# Patient Record
Sex: MALE | Race: WHITE | NOT HISPANIC OR LATINO | Employment: FULL TIME | URBAN - METROPOLITAN AREA
[De-identification: names, ages, dates, MRNs, and addresses within clinical notes are randomized per-mention and may not be internally consistent; named-entity substitution may affect disease eponyms.]

---

## 2020-12-15 ENCOUNTER — HISTORICAL (OUTPATIENT)
Dept: ADMINISTRATIVE | Facility: HOSPITAL | Age: 30
End: 2020-12-15

## 2021-06-22 ENCOUNTER — HISTORICAL (OUTPATIENT)
Dept: ADMINISTRATIVE | Facility: HOSPITAL | Age: 31
End: 2021-06-22

## 2021-06-22 LAB
ALBUMIN SERPL-MCNC: 5.2 G/DL (ref 4.1–5.2)
ALBUMIN/GLOB SERPL: 2.4 {RATIO} (ref 1.2–2.2)
ALP SERPL-CCNC: 62 IU/L (ref 48–121)
ALT SERPL-CCNC: 13 IU/L (ref 0–44)
AST SERPL-CCNC: 15 IU/L (ref 0–40)
BASOPHILS # BLD AUTO: 0 X10E3/UL (ref 0–0.2)
BASOPHILS NFR BLD AUTO: 1 %
BILIRUB SERPL-MCNC: 0.5 MG/DL (ref 0–1.2)
BUN SERPL-MCNC: 12 MG/DL (ref 6–20)
CALCIUM SERPL-MCNC: 10.4 MG/DL (ref 8.7–10.2)
CHLORIDE SERPL-SCNC: 95 MMOL/L (ref 96–106)
CHOLEST SERPL-MCNC: 225 MG/DL (ref 100–199)
CHOLEST/HDLC SERPL: 4 RATIO (ref 0–5)
CO2 SERPL-SCNC: 27 MMOL/L (ref 20–29)
CREAT SERPL-MCNC: 1 MG/DL (ref 0.76–1.27)
CREAT/UREA NIT SERPL: 12 (ref 9–20)
EOSINOPHIL # BLD AUTO: 0.1 X10E3/UL (ref 0–0.4)
EOSINOPHIL NFR BLD AUTO: 1 %
ERYTHROCYTE [DISTWIDTH] IN BLOOD BY AUTOMATED COUNT: 12.6 % (ref 11.6–15.4)
GLOBULIN SER-MCNC: 2.2 G/DL (ref 1.5–4.5)
GLUCOSE SERPL-MCNC: 99 MG/DL (ref 65–99)
HBA1C MFR BLD: 5.6 % (ref 4.8–5.6)
HCT VFR BLD AUTO: 46.5 % (ref 37.5–51)
HDLC SERPL-MCNC: 56 MG/DL
HGB BLD-MCNC: 15.3 G/DL (ref 13–17.7)
LDLC SERPL CALC-MCNC: 136 MG/DL (ref 0–99)
LYMPHOCYTES # BLD AUTO: 2.4 X10E3/UL (ref 0.7–3.1)
LYMPHOCYTES NFR BLD AUTO: 39 %
MCH RBC QN AUTO: 29.9 PG (ref 26.6–33)
MCHC RBC AUTO-ENTMCNC: 32.9 G/DL (ref 31.5–35.7)
MCV RBC AUTO: 91 FL (ref 79–97)
MONOCYTES # BLD AUTO: 0.4 X10E3/UL (ref 0.1–0.9)
MONOCYTES NFR BLD AUTO: 7 %
NEUTROPHILS # BLD AUTO: 3.3 X10E3/UL (ref 1.4–7)
NEUTROPHILS NFR BLD AUTO: 52 %
PLATELET # BLD AUTO: 223 X10E3/UL (ref 150–450)
POTASSIUM SERPL-SCNC: 5.1 MMOL/L (ref 3.5–5.2)
PROT SERPL-MCNC: 7.4 G/DL (ref 6–8.5)
RBC # BLD AUTO: 5.11 X10(6)/MCL (ref 4.14–5.8)
SODIUM SERPL-SCNC: 139 MMOL/L (ref 134–144)
TRIGL SERPL-MCNC: 186 MG/DL (ref 0–149)
VLDLC SERPL CALC-MCNC: 33 MG/DL (ref 5–40)
WBC # SPEC AUTO: 6.3 X10E3/UL (ref 3.4–10.8)

## 2022-04-11 ENCOUNTER — HISTORICAL (OUTPATIENT)
Dept: ADMINISTRATIVE | Facility: HOSPITAL | Age: 32
End: 2022-04-11

## 2022-04-29 VITALS
BODY MASS INDEX: 30.08 KG/M2 | SYSTOLIC BLOOD PRESSURE: 132 MMHG | WEIGHT: 210.13 LBS | DIASTOLIC BLOOD PRESSURE: 64 MMHG | HEIGHT: 70 IN | OXYGEN SATURATION: 97 %

## 2023-06-11 ENCOUNTER — HOSPITAL ENCOUNTER (EMERGENCY)
Facility: HOSPITAL | Age: 33
Discharge: HOME | End: 2023-06-11
Payer: COMMERCIAL

## 2023-06-11 VITALS
WEIGHT: 205 LBS | BODY MASS INDEX: 29.35 KG/M2 | OXYGEN SATURATION: 98 % | RESPIRATION RATE: 18 BRPM | HEIGHT: 70 IN | DIASTOLIC BLOOD PRESSURE: 84 MMHG | TEMPERATURE: 97 F | HEART RATE: 74 BPM | SYSTOLIC BLOOD PRESSURE: 146 MMHG

## 2023-06-11 DIAGNOSIS — T78.3XXA ANGIOEDEMA, INITIAL ENCOUNTER: Primary | ICD-10-CM

## 2023-06-11 PROCEDURE — 96372 THER/PROPH/DIAG INJ SC/IM: CPT | Performed by: NURSE PRACTITIONER

## 2023-06-11 PROCEDURE — 99284 EMERGENCY DEPT VISIT MOD MDM: CPT

## 2023-06-11 PROCEDURE — 25000003 PHARM REV CODE 250: Performed by: NURSE PRACTITIONER

## 2023-06-11 PROCEDURE — 63600175 PHARM REV CODE 636 W HCPCS: Performed by: NURSE PRACTITIONER

## 2023-06-11 RX ORDER — TIRZEPATIDE 7.5 MG/.5ML
7.5 INJECTION, SOLUTION SUBCUTANEOUS
COMMUNITY
End: 2023-08-17 | Stop reason: ALTCHOICE

## 2023-06-11 RX ORDER — LEVOCETIRIZINE DIHYDROCHLORIDE 5 MG/1
5 TABLET, FILM COATED ORAL NIGHTLY
Qty: 30 TABLET | Refills: 0 | Status: SHIPPED | OUTPATIENT
Start: 2023-06-11 | End: 2023-10-25

## 2023-06-11 RX ORDER — FAMOTIDINE 40 MG/1
40 TABLET, FILM COATED ORAL DAILY
Qty: 30 TABLET | Refills: 0 | Status: SHIPPED | OUTPATIENT
Start: 2023-06-11 | End: 2023-10-25

## 2023-06-11 RX ORDER — METHYLPREDNISOLONE SOD SUCC 125 MG
125 VIAL (EA) INJECTION
Status: COMPLETED | OUTPATIENT
Start: 2023-06-11 | End: 2023-06-11

## 2023-06-11 RX ORDER — DIPHENHYDRAMINE HYDROCHLORIDE 50 MG/ML
100 INJECTION INTRAMUSCULAR; INTRAVENOUS
Status: COMPLETED | OUTPATIENT
Start: 2023-06-11 | End: 2023-06-11

## 2023-06-11 RX ORDER — PREDNISONE 20 MG/1
20 TABLET ORAL DAILY
Qty: 5 TABLET | Refills: 0 | Status: SHIPPED | OUTPATIENT
Start: 2023-06-11 | End: 2023-07-06 | Stop reason: ALTCHOICE

## 2023-06-11 RX ORDER — FAMOTIDINE 20 MG/1
40 TABLET, FILM COATED ORAL
Status: COMPLETED | OUTPATIENT
Start: 2023-06-11 | End: 2023-06-11

## 2023-06-11 RX ADMIN — DIPHENHYDRAMINE HYDROCHLORIDE 100 MG: 50 INJECTION INTRAMUSCULAR; INTRAVENOUS at 06:06

## 2023-06-11 RX ADMIN — FAMOTIDINE 40 MG: 20 TABLET, FILM COATED ORAL at 06:06

## 2023-06-11 RX ADMIN — METHYLPREDNISOLONE SODIUM SUCCINATE 125 MG: 125 INJECTION, POWDER, FOR SOLUTION INTRAMUSCULAR; INTRAVENOUS at 07:06

## 2023-06-11 NOTE — ED PROVIDER NOTES
Encounter Date: 6/11/2023       History     Chief Complaint   Patient presents with    Oral Swelling     Pt reports lip swelling onset this am around 0630 this am, started to right corner of upper lip. Pt reports is taking lisinopril, called his PCP (Dr. Chou) and was told to take 50mg of Benadryl. Pt reports has been on Lisinopril for years. Also reports thought he got his deodorant on his upper lip so he put some on the left side and swelling started within 20 minutes.      Sharif presents with a swollen top lip that started this morning and progressed throughout the day.  He is on Lisinopril, but has been for quite some time.  He is also using a deodorant that he thinks may have caused the swelling.  He has no tongue swelling, no breathing issues, no swallowing issues.      The history is provided by the patient.   Review of patient's allergies indicates:   Allergen Reactions    Ace inhibitors Swelling     Past Medical History:   Diagnosis Date    Depression     Hypertension      No past surgical history on file.  No family history on file.     Review of Systems   Constitutional:  Negative for fatigue and fever.   HENT:  Negative for congestion and rhinorrhea.         Swelling of the upper lip     Eyes:  Negative for visual disturbance.   Respiratory:  Negative for cough, shortness of breath and wheezing.    Cardiovascular:  Negative for chest pain and palpitations.   Gastrointestinal:  Negative for abdominal distention, abdominal pain, diarrhea, nausea and vomiting.   Endocrine: Negative for cold intolerance and heat intolerance.   Genitourinary:  Negative for dysuria.   Musculoskeletal:  Negative for arthralgias and myalgias.   Skin:  Negative for rash.   Neurological:  Negative for dizziness, weakness, light-headedness, numbness and headaches.   Hematological:  Negative for adenopathy. Does not bruise/bleed easily.   Psychiatric/Behavioral:  Negative for dysphoric mood. The patient is not nervous/anxious.       Physical Exam     Initial Vitals [06/11/23 1823]   BP Pulse Resp Temp SpO2   (!) 146/84 74 18 97 °F (36.1 °C) 98 %      MAP       --         Physical Exam    Nursing note and vitals reviewed.  Constitutional: Vital signs are normal. He appears well-developed and well-nourished. He does not appear ill. No distress.   HENT:   Head: Normocephalic and atraumatic.   Angioedema of the upper lip     Eyes: Conjunctivae and EOM are normal.   Neck: Neck supple.   Normal range of motion.  Cardiovascular:  Normal rate and regular rhythm.           Pulmonary/Chest: Effort normal and breath sounds normal. No respiratory distress. He has no wheezes. He has no rhonchi. He has no rales.   Abdominal: Abdomen is soft. Bowel sounds are normal. There is no abdominal tenderness.   Musculoskeletal:         General: No edema. Normal range of motion.      Cervical back: Normal range of motion and neck supple.     Neurological: He is alert and oriented to person, place, and time. He has normal strength.   Skin: Skin is warm, dry and intact.   Psychiatric: He has a normal mood and affect. His speech is normal and behavior is normal. Judgment and thought content normal. Cognition and memory are normal.       ED Course   Procedures  Labs Reviewed - No data to display       Imaging Results    None          Medications   methylPREDNISolone sodium succinate injection 125 mg (has no administration in time range)   diphenhydrAMINE injection 100 mg (has no administration in time range)   famotidine tablet 40 mg (has no administration in time range)                              Clinical Impression:   Final diagnoses:  [T78.3XXA] Angioedema, initial encounter (Primary)        ED Disposition Condition    Discharge Stable          ED Prescriptions       Medication Sig Dispense Start Date End Date Auth. Provider    levocetirizine (XYZAL) 5 MG tablet Take 1 tablet (5 mg total) by mouth every evening. 30 tablet 6/11/2023 6/10/2024 H. Cordell Tripathi  DARYA    famotidine (PEPCID) 40 MG tablet Take 1 tablet (40 mg total) by mouth once daily. 30 tablet 6/11/2023 6/10/2024 H. DARYA Marc    predniSONE (DELTASONE) 20 MG tablet Take 1 tablet (20 mg total) by mouth once daily. 5 tablet 6/11/2023 -- H. DARYA Marc          Follow-up Information       Follow up With Specialties Details Why Contact Info    Tom Castro MD Family Medicine Call in 1 day  [address]  [address] [facility] [address]  [phone]               H. DARYA Marc  06/11/23 9211

## 2023-06-11 NOTE — DISCHARGE INSTRUCTIONS
Stop Lisiniopril  Call Dr. Castro in the morning to tell him what happened and talk about getting on a new blood pressure medication.  I suggest adding Lisinopril (Ace Inhibitors) to your allergy list.   Prednisone at home, Pepcid at home, and Xyzal at home to help with the reaction.   If you have continued or worsening swelling of the lip or tongue or any trouble breathing or swallowing, come back to ER.

## 2023-06-12 ENCOUNTER — TELEPHONE (OUTPATIENT)
Dept: FAMILY MEDICINE | Facility: CLINIC | Age: 33
End: 2023-06-12
Payer: COMMERCIAL

## 2023-06-15 ENCOUNTER — OFFICE VISIT (OUTPATIENT)
Dept: FAMILY MEDICINE | Facility: CLINIC | Age: 33
End: 2023-06-15
Payer: COMMERCIAL

## 2023-06-15 VITALS
WEIGHT: 210 LBS | OXYGEN SATURATION: 97 % | SYSTOLIC BLOOD PRESSURE: 136 MMHG | TEMPERATURE: 97 F | HEART RATE: 109 BPM | DIASTOLIC BLOOD PRESSURE: 70 MMHG | HEIGHT: 70 IN | BODY MASS INDEX: 30.06 KG/M2

## 2023-06-15 DIAGNOSIS — T78.3XXA ANGIOEDEMA DUE TO ANGIOTENSIN CONVERTING ENZYME INHIBITOR (ACE-I): ICD-10-CM

## 2023-06-15 DIAGNOSIS — I10 PRIMARY HYPERTENSION: Primary | ICD-10-CM

## 2023-06-15 DIAGNOSIS — T46.4X5A ANGIOEDEMA DUE TO ANGIOTENSIN CONVERTING ENZYME INHIBITOR (ACE-I): ICD-10-CM

## 2023-06-15 PROBLEM — E78.00 PURE HYPERCHOLESTEROLEMIA: Status: ACTIVE | Noted: 2023-06-15

## 2023-06-15 PROBLEM — F41.1 GENERALIZED ANXIETY DISORDER: Status: ACTIVE | Noted: 2023-06-15

## 2023-06-15 PROCEDURE — 1159F MED LIST DOCD IN RCRD: CPT | Mod: CPTII,,, | Performed by: FAMILY MEDICINE

## 2023-06-15 PROCEDURE — 4010F PR ACE/ARB THEARPY RXD/TAKEN: ICD-10-PCS | Mod: CPTII,,, | Performed by: FAMILY MEDICINE

## 2023-06-15 PROCEDURE — 99214 PR OFFICE/OUTPT VISIT, EST, LEVL IV, 30-39 MIN: ICD-10-PCS | Mod: ,,, | Performed by: FAMILY MEDICINE

## 2023-06-15 PROCEDURE — 99214 OFFICE O/P EST MOD 30 MIN: CPT | Mod: ,,, | Performed by: FAMILY MEDICINE

## 2023-06-15 PROCEDURE — 3075F SYST BP GE 130 - 139MM HG: CPT | Mod: CPTII,,, | Performed by: FAMILY MEDICINE

## 2023-06-15 PROCEDURE — 3008F BODY MASS INDEX DOCD: CPT | Mod: CPTII,,, | Performed by: FAMILY MEDICINE

## 2023-06-15 PROCEDURE — 3078F PR MOST RECENT DIASTOLIC BLOOD PRESSURE < 80 MM HG: ICD-10-PCS | Mod: CPTII,,, | Performed by: FAMILY MEDICINE

## 2023-06-15 PROCEDURE — 1160F RVW MEDS BY RX/DR IN RCRD: CPT | Mod: CPTII,,, | Performed by: FAMILY MEDICINE

## 2023-06-15 PROCEDURE — 1160F PR REVIEW ALL MEDS BY PRESCRIBER/CLIN PHARMACIST DOCUMENTED: ICD-10-PCS | Mod: CPTII,,, | Performed by: FAMILY MEDICINE

## 2023-06-15 PROCEDURE — 3008F PR BODY MASS INDEX (BMI) DOCUMENTED: ICD-10-PCS | Mod: CPTII,,, | Performed by: FAMILY MEDICINE

## 2023-06-15 PROCEDURE — 3075F PR MOST RECENT SYSTOLIC BLOOD PRESS GE 130-139MM HG: ICD-10-PCS | Mod: CPTII,,, | Performed by: FAMILY MEDICINE

## 2023-06-15 PROCEDURE — 4010F ACE/ARB THERAPY RXD/TAKEN: CPT | Mod: CPTII,,, | Performed by: FAMILY MEDICINE

## 2023-06-15 PROCEDURE — 3078F DIAST BP <80 MM HG: CPT | Mod: CPTII,,, | Performed by: FAMILY MEDICINE

## 2023-06-15 PROCEDURE — 1159F PR MEDICATION LIST DOCUMENTED IN MEDICAL RECORD: ICD-10-PCS | Mod: CPTII,,, | Performed by: FAMILY MEDICINE

## 2023-06-15 RX ORDER — AMLODIPINE BESYLATE 5 MG/1
5 TABLET ORAL DAILY
Qty: 90 TABLET | Refills: 3 | Status: SHIPPED | OUTPATIENT
Start: 2023-06-15 | End: 2023-07-06 | Stop reason: SINTOL

## 2023-06-15 RX ORDER — CHLORTHALIDONE 25 MG/1
25 TABLET ORAL DAILY
Qty: 90 TABLET | Refills: 3 | Status: SHIPPED | OUTPATIENT
Start: 2023-06-15 | End: 2023-07-06 | Stop reason: SINTOL

## 2023-06-15 NOTE — PROGRESS NOTES
"SUBJECTIVE:  HPI    Sharif Conley is a 32 y.o. male here for Discuss Medication (Blood pressure).  The patient developed upper lip angioedema this past weekend.  He would to the emergency room and was instructed to stop his ACE-inhibitor.  His blood pressures have been elevated.  He has not had any recurrence of angioedema the symptoms resolved.    Sharif's allergies, medications, history, and problem list were updated as appropriate.    ROS:  Pertinent ROS as above, otherwise negative    OBJECTIVE:  Vital signs  Visit Vitals  /70 (BP Location: Right arm, Patient Position: Sitting)   Pulse 109   Temp 97.4 °F (36.3 °C) (Temporal)   Ht 5' 10" (1.778 m)   Wt 95.3 kg (210 lb)   SpO2 97%   BMI 30.13 kg/m²          PHYSICAL EXAM:  General:  Awake, alert, no acute distress   Eyes:  Pupils equal, round, reactive to light.  Conjunctiva normal bilaterally.    ASSESSMENT/PLAN:  1. Primary hypertension  -     amLODIPine (NORVASC) 5 MG tablet; Take 1 tablet (5 mg total) by mouth once daily.  Dispense: 90 tablet; Refill: 3  -     chlorthalidone (HYGROTEN) 25 MG Tab; Take 1 tablet (25 mg total) by mouth once daily.  Dispense: 90 tablet; Refill: 3    2. Angioedema due to angiotensin converting enzyme inhibitor (ACE-I)  Assessment & Plan:  Agree with discontinuance of lisinopril HCTZ        Follow Up:  Follow up in about 2 months (around 8/15/2023) for Follow-up.          "

## 2023-07-03 ENCOUNTER — TELEPHONE (OUTPATIENT)
Dept: FAMILY MEDICINE | Facility: CLINIC | Age: 33
End: 2023-07-03
Payer: COMMERCIAL

## 2023-07-03 NOTE — TELEPHONE ENCOUNTER
Spoke to wife-instructed to monitor B/P and pulse and record.Wife also instructed to call if symptoms persist or worsen-also try and not get overheated and stay hydrated.

## 2023-07-03 NOTE — TELEPHONE ENCOUNTER
Spoke to pts wife-(he is at work)saw Dr Castro on 06/15 and stopped Lisinopril-HCTZ.He was started on Amlodipine and Chlorthalidone,his meds came in about a week ago and he started them then -now he is having palpitations and slightly SOB three days ago.His B/P has been running in the 130's,didn't know what his pulse was.Please advise.

## 2023-07-03 NOTE — TELEPHONE ENCOUNTER
BP meds were recently changed and now he is having excessive heart palpitations. Wife is asking that someone call her back.

## 2023-07-05 ENCOUNTER — TELEPHONE (OUTPATIENT)
Dept: FAMILY MEDICINE | Facility: CLINIC | Age: 33
End: 2023-07-05
Payer: COMMERCIAL

## 2023-07-05 NOTE — TELEPHONE ENCOUNTER
Spoke to wife, she will talk to Sharif and call us back on how he wants to proceed.. pt is at work.

## 2023-07-05 NOTE — TELEPHONE ENCOUNTER
Pt is requesting to get back on Lisinopril/HCTZ.. he states his new BP medications are giving him palpitations and slight SOB.. Bps running in the 130s  he called on Monday expressing this and Dr. Chou told him to monitor BP and make sure he's not dehydrated and to call back if symptoms worsen  or persist..   his wife states that he is still feeling bad and he just does not like the new medications.. he's on Amlodipine and Chlorthalidone currently

## 2023-07-06 ENCOUNTER — OFFICE VISIT (OUTPATIENT)
Dept: FAMILY MEDICINE | Facility: CLINIC | Age: 33
End: 2023-07-06
Payer: COMMERCIAL

## 2023-07-06 VITALS
TEMPERATURE: 99 F | HEIGHT: 70 IN | HEART RATE: 78 BPM | BODY MASS INDEX: 29.97 KG/M2 | SYSTOLIC BLOOD PRESSURE: 118 MMHG | OXYGEN SATURATION: 98 % | WEIGHT: 209.38 LBS | DIASTOLIC BLOOD PRESSURE: 88 MMHG

## 2023-07-06 DIAGNOSIS — I49.3 PVC'S (PREMATURE VENTRICULAR CONTRACTIONS): ICD-10-CM

## 2023-07-06 DIAGNOSIS — I10 PRIMARY HYPERTENSION: Primary | ICD-10-CM

## 2023-07-06 DIAGNOSIS — R00.2 PALPITATIONS: ICD-10-CM

## 2023-07-06 PROCEDURE — 3074F SYST BP LT 130 MM HG: CPT | Mod: CPTII,,, | Performed by: FAMILY MEDICINE

## 2023-07-06 PROCEDURE — 1159F MED LIST DOCD IN RCRD: CPT | Mod: CPTII,,, | Performed by: FAMILY MEDICINE

## 2023-07-06 PROCEDURE — 3079F DIAST BP 80-89 MM HG: CPT | Mod: CPTII,,, | Performed by: FAMILY MEDICINE

## 2023-07-06 PROCEDURE — 99214 PR OFFICE/OUTPT VISIT, EST, LEVL IV, 30-39 MIN: ICD-10-PCS | Mod: ,,, | Performed by: FAMILY MEDICINE

## 2023-07-06 PROCEDURE — 3074F PR MOST RECENT SYSTOLIC BLOOD PRESSURE < 130 MM HG: ICD-10-PCS | Mod: CPTII,,, | Performed by: FAMILY MEDICINE

## 2023-07-06 PROCEDURE — 1159F PR MEDICATION LIST DOCUMENTED IN MEDICAL RECORD: ICD-10-PCS | Mod: CPTII,,, | Performed by: FAMILY MEDICINE

## 2023-07-06 PROCEDURE — 99214 OFFICE O/P EST MOD 30 MIN: CPT | Mod: ,,, | Performed by: FAMILY MEDICINE

## 2023-07-06 PROCEDURE — 4010F PR ACE/ARB THEARPY RXD/TAKEN: ICD-10-PCS | Mod: CPTII,,, | Performed by: FAMILY MEDICINE

## 2023-07-06 PROCEDURE — 4010F ACE/ARB THERAPY RXD/TAKEN: CPT | Mod: CPTII,,, | Performed by: FAMILY MEDICINE

## 2023-07-06 PROCEDURE — 3008F PR BODY MASS INDEX (BMI) DOCUMENTED: ICD-10-PCS | Mod: CPTII,,, | Performed by: FAMILY MEDICINE

## 2023-07-06 PROCEDURE — 83735 ASSAY OF MAGNESIUM: CPT | Performed by: FAMILY MEDICINE

## 2023-07-06 PROCEDURE — 3008F BODY MASS INDEX DOCD: CPT | Mod: CPTII,,, | Performed by: FAMILY MEDICINE

## 2023-07-06 PROCEDURE — 1160F RVW MEDS BY RX/DR IN RCRD: CPT | Mod: CPTII,,, | Performed by: FAMILY MEDICINE

## 2023-07-06 PROCEDURE — 80048 BASIC METABOLIC PNL TOTAL CA: CPT | Performed by: FAMILY MEDICINE

## 2023-07-06 PROCEDURE — 1160F PR REVIEW ALL MEDS BY PRESCRIBER/CLIN PHARMACIST DOCUMENTED: ICD-10-PCS | Mod: CPTII,,, | Performed by: FAMILY MEDICINE

## 2023-07-06 PROCEDURE — 3079F PR MOST RECENT DIASTOLIC BLOOD PRESSURE 80-89 MM HG: ICD-10-PCS | Mod: CPTII,,, | Performed by: FAMILY MEDICINE

## 2023-07-06 NOTE — ASSESSMENT & PLAN NOTE
Discontinue chlorthalidone and amlodipine because of palpitations    Check basic metabolic panel level today    Consider beta-blocker and restarting chlorthalidone after review of electrolytes   yes

## 2023-07-06 NOTE — PROGRESS NOTES
"SUBJECTIVE:  HPI    Sharif Conley is a 32 y.o. male here for BP issues.  We had to discontinue ACE-inhibitor therapy because of angioedema.  He was started on amlodipine 5 mg a day chlorthalidone 25 mg a day for blood pressure control several weeks again.  Four days ago, he noticed a remarkable increase in experiencing symptomatic palpitations.  The palpitations are present throughout the day in her brief in nature.  The palpitations seemingly resolved with increased activity level.  He was concerned that amlodipine or chlorthalidone were causing his palpitations so he discontinued taking them yesterday.  His symptoms seem a little improved today.    Martinas allergies, medications, history, and problem list were updated as appropriate.    ROS:  Pertinent ROS as above, otherwise negative    OBJECTIVE:  Vital signs  Visit Vitals  /88 (BP Location: Left arm)   Pulse 78   Temp 98.6 °F (37 °C) (Temporal)   Ht 5' 10" (1.778 m)   Wt 95 kg (209 lb 6.4 oz)   SpO2 98%   BMI 30.05 kg/m²      PHYSICAL EXAM:  General:  Awake, alert, no acute distress   Eyes:  Pupils equal, round, reactive to light.  Conjunctiva normal bilaterally.  Cardiovascular: Regular rate and rhythm.  No murmurs.  Respiratory: Clear to auscultation bilaterally, normal effort  Extremities:  No peripheral edema, no cyanosis  Skin: No rashes    EKG, my interpretation:  Normal sinus rhythm, normal intervals with occasional PVCs noted that were symptomatic at the time of EKG    ASSESSMENT/PLAN:  1. Primary hypertension  Assessment & Plan:  Discontinue chlorthalidone and amlodipine because of palpitations    Check basic metabolic panel level today    Consider beta-blocker and restarting chlorthalidone after review of electrolytes    Orders:  -     Basic Metabolic Panel; Future; Expected date: 07/06/2023  -     Magnesium; Future; Expected date: 07/06/2023    2. Palpitations  -     POCT EKG 12-LEAD (NOT FOR [facility] USE)  -     Basic Metabolic Panel; Future; " Expected date: 07/06/2023  -     Magnesium; Future; Expected date: 07/06/2023    3. PVC's (premature ventricular contractions)  Assessment & Plan:  Check basic metabolic panel and magnesium today    Consider beta-blocker      Return to clinic in August as scheduled.  Will call with above results and further plan prior to that appointment.

## 2023-07-07 LAB
ANION GAP SERPL CALC-SCNC: 6 MEQ/L (ref 2–13)
BUN SERPL-MCNC: 21 MG/DL (ref 7–20)
CALCIUM SERPL-MCNC: 9.4 MG/DL (ref 8.4–10.2)
CHLORIDE SERPL-SCNC: 96 MMOL/L (ref 98–110)
CO2 SERPL-SCNC: 35 MMOL/L (ref 21–32)
CREAT SERPL-MCNC: 1.07 MG/DL (ref 0.66–1.25)
CREAT/UREA NIT SERPL: 20 (ref 12–20)
GFR SERPLBLD CREATININE-BSD FMLA CKD-EPI: >90 MLS/MIN/1.73/M2
GLUCOSE SERPL-MCNC: 96 MG/DL (ref 70–115)
MAGNESIUM SERPL-MCNC: 2.2 MG/DL (ref 1.8–2.4)
POTASSIUM SERPL-SCNC: 3.4 MMOL/L (ref 3.5–5.1)
SODIUM SERPL-SCNC: 137 MMOL/L (ref 135–145)

## 2023-07-10 ENCOUNTER — TELEPHONE (OUTPATIENT)
Dept: FAMILY MEDICINE | Facility: CLINIC | Age: 33
End: 2023-07-10
Payer: COMMERCIAL

## 2023-07-11 ENCOUNTER — TELEPHONE (OUTPATIENT)
Dept: FAMILY MEDICINE | Facility: CLINIC | Age: 33
End: 2023-07-11
Payer: COMMERCIAL

## 2023-07-11 DIAGNOSIS — R00.2 PALPITATIONS: Primary | ICD-10-CM

## 2023-07-11 RX ORDER — METOPROLOL SUCCINATE 25 MG/1
25 TABLET, EXTENDED RELEASE ORAL DAILY
Qty: 30 TABLET | Refills: 0 | Status: SHIPPED | OUTPATIENT
Start: 2023-07-11 | End: 2023-07-24 | Stop reason: SDUPTHER

## 2023-07-11 NOTE — TELEPHONE ENCOUNTER
----- Message from Tom Castro MD sent at 7/10/2023  5:52 PM CDT -----  His potassium was a little bit low but that is a side effect of the chlorthalidone that we discontinued.  He should increase his potassium intake for the next several days with potassium rich foods like orange juice, bananas, beans.    How have his blood pressures and palpitations?  If the blood pressures have been elevated or the palpitations have been persistent, I would recommend that we start metoprolol succinate 25 mg daily and we will see him back in August as scheduled.

## 2023-07-24 ENCOUNTER — TELEPHONE (OUTPATIENT)
Dept: FAMILY MEDICINE | Facility: CLINIC | Age: 33
End: 2023-07-24
Payer: COMMERCIAL

## 2023-07-24 DIAGNOSIS — R00.2 PALPITATIONS: ICD-10-CM

## 2023-07-24 RX ORDER — METOPROLOL SUCCINATE 25 MG/1
25 TABLET, EXTENDED RELEASE ORAL DAILY
Qty: 30 TABLET | Refills: 0 | Status: SHIPPED | OUTPATIENT
Start: 2023-07-24 | End: 2023-07-24 | Stop reason: SDUPTHER

## 2023-07-24 RX ORDER — METOPROLOL SUCCINATE 25 MG/1
25 TABLET, EXTENDED RELEASE ORAL DAILY
Qty: 30 TABLET | Refills: 5 | Status: SHIPPED | OUTPATIENT
Start: 2023-07-24 | End: 2023-08-17 | Stop reason: SDUPTHER

## 2023-08-17 ENCOUNTER — OFFICE VISIT (OUTPATIENT)
Dept: FAMILY MEDICINE | Facility: CLINIC | Age: 33
End: 2023-08-17
Payer: COMMERCIAL

## 2023-08-17 VITALS
BODY MASS INDEX: 31.59 KG/M2 | OXYGEN SATURATION: 97 % | WEIGHT: 220.63 LBS | HEIGHT: 70 IN | TEMPERATURE: 99 F | SYSTOLIC BLOOD PRESSURE: 118 MMHG | DIASTOLIC BLOOD PRESSURE: 78 MMHG | HEART RATE: 66 BPM

## 2023-08-17 DIAGNOSIS — I10 PRIMARY HYPERTENSION: Primary | ICD-10-CM

## 2023-08-17 DIAGNOSIS — R00.2 PALPITATIONS: ICD-10-CM

## 2023-08-17 PROCEDURE — 1159F MED LIST DOCD IN RCRD: CPT | Mod: CPTII,,, | Performed by: FAMILY MEDICINE

## 2023-08-17 PROCEDURE — 1160F PR REVIEW ALL MEDS BY PRESCRIBER/CLIN PHARMACIST DOCUMENTED: ICD-10-PCS | Mod: CPTII,,, | Performed by: FAMILY MEDICINE

## 2023-08-17 PROCEDURE — 3008F PR BODY MASS INDEX (BMI) DOCUMENTED: ICD-10-PCS | Mod: CPTII,,, | Performed by: FAMILY MEDICINE

## 2023-08-17 PROCEDURE — 1159F PR MEDICATION LIST DOCUMENTED IN MEDICAL RECORD: ICD-10-PCS | Mod: CPTII,,, | Performed by: FAMILY MEDICINE

## 2023-08-17 PROCEDURE — 4010F PR ACE/ARB THEARPY RXD/TAKEN: ICD-10-PCS | Mod: CPTII,,, | Performed by: FAMILY MEDICINE

## 2023-08-17 PROCEDURE — 3078F PR MOST RECENT DIASTOLIC BLOOD PRESSURE < 80 MM HG: ICD-10-PCS | Mod: CPTII,,, | Performed by: FAMILY MEDICINE

## 2023-08-17 PROCEDURE — 3074F PR MOST RECENT SYSTOLIC BLOOD PRESSURE < 130 MM HG: ICD-10-PCS | Mod: CPTII,,, | Performed by: FAMILY MEDICINE

## 2023-08-17 PROCEDURE — 3074F SYST BP LT 130 MM HG: CPT | Mod: CPTII,,, | Performed by: FAMILY MEDICINE

## 2023-08-17 PROCEDURE — 3078F DIAST BP <80 MM HG: CPT | Mod: CPTII,,, | Performed by: FAMILY MEDICINE

## 2023-08-17 PROCEDURE — 99214 OFFICE O/P EST MOD 30 MIN: CPT | Mod: ,,, | Performed by: FAMILY MEDICINE

## 2023-08-17 PROCEDURE — 99214 PR OFFICE/OUTPT VISIT, EST, LEVL IV, 30-39 MIN: ICD-10-PCS | Mod: ,,, | Performed by: FAMILY MEDICINE

## 2023-08-17 PROCEDURE — 1160F RVW MEDS BY RX/DR IN RCRD: CPT | Mod: CPTII,,, | Performed by: FAMILY MEDICINE

## 2023-08-17 PROCEDURE — 4010F ACE/ARB THERAPY RXD/TAKEN: CPT | Mod: CPTII,,, | Performed by: FAMILY MEDICINE

## 2023-08-17 PROCEDURE — 3008F BODY MASS INDEX DOCD: CPT | Mod: CPTII,,, | Performed by: FAMILY MEDICINE

## 2023-08-17 RX ORDER — METOPROLOL SUCCINATE 25 MG/1
25 TABLET, EXTENDED RELEASE ORAL DAILY
Qty: 90 TABLET | Refills: 3 | Status: SHIPPED | OUTPATIENT
Start: 2023-08-17

## 2023-08-17 NOTE — PROGRESS NOTES
"SUBJECTIVE:  HPI    Sharif Conley is a 32 y.o. male here for Follow-up on hypertension and palpitations.  He is now off of amlodipine and chlorthalidone.  He is tolerating metoprolol succinate 25 mg daily without any side effects.  His blood pressures have been normal at home.  He has not had any palpitations.    Martinas allergies, medications, history, and problem list were updated as appropriate.    ROS:  Pertinent ROS as above, otherwise negative    OBJECTIVE:  Vital signs  Visit Vitals  /78 (BP Location: Right arm)   Pulse 66   Temp 98.7 °F (37.1 °C) (Temporal)   Ht 5' 10" (1.778 m)   Wt 100.1 kg (220 lb 9.6 oz)   SpO2 97%   BMI 31.65 kg/m²          PHYSICAL EXAM:  General:  Awake, alert, no acute distress   Eyes:  Pupils equal, round, reactive to light.  Conjunctiva normal bilaterally.  Cardiovascular: Regular rate and rhythm.  No murmurs.  Respiratory: Clear to auscultation bilaterally, normal effort  Extremities:  No peripheral edema, no cyanosis  Skin: No rashes    ASSESSMENT/PLAN:  1. Primary hypertension  -     metoprolol succinate (TOPROL-XL) 25 MG 24 hr tablet; Take 1 tablet (25 mg total) by mouth once daily.  Dispense: 90 tablet; Refill: 3    2. Palpitations  -     metoprolol succinate (TOPROL-XL) 25 MG 24 hr tablet; Take 1 tablet (25 mg total) by mouth once daily.  Dispense: 90 tablet; Refill: 3    Continue metoprolol succinate 25 mg daily     Monitor for signs and symptoms of hypotension.    Decrease caffeine intake    Follow Up:  Follow up in about 6 months (around 2/17/2024) for Follow-up.          "

## 2023-10-25 ENCOUNTER — OFFICE VISIT (OUTPATIENT)
Dept: FAMILY MEDICINE | Facility: CLINIC | Age: 33
End: 2023-10-25
Payer: COMMERCIAL

## 2023-10-25 VITALS
DIASTOLIC BLOOD PRESSURE: 88 MMHG | HEART RATE: 71 BPM | WEIGHT: 227.19 LBS | BODY MASS INDEX: 32.52 KG/M2 | OXYGEN SATURATION: 99 % | TEMPERATURE: 99 F | SYSTOLIC BLOOD PRESSURE: 138 MMHG | HEIGHT: 70 IN

## 2023-10-25 DIAGNOSIS — F43.0 STRESS REACTION: ICD-10-CM

## 2023-10-25 PROCEDURE — 4010F PR ACE/ARB THEARPY RXD/TAKEN: ICD-10-PCS | Mod: CPTII,,, | Performed by: FAMILY MEDICINE

## 2023-10-25 PROCEDURE — 99212 PR OFFICE/OUTPT VISIT, EST, LEVL II, 10-19 MIN: ICD-10-PCS | Mod: ,,, | Performed by: FAMILY MEDICINE

## 2023-10-25 PROCEDURE — 3075F SYST BP GE 130 - 139MM HG: CPT | Mod: CPTII,,, | Performed by: FAMILY MEDICINE

## 2023-10-25 PROCEDURE — 1160F RVW MEDS BY RX/DR IN RCRD: CPT | Mod: CPTII,,, | Performed by: FAMILY MEDICINE

## 2023-10-25 PROCEDURE — 3008F PR BODY MASS INDEX (BMI) DOCUMENTED: ICD-10-PCS | Mod: CPTII,,, | Performed by: FAMILY MEDICINE

## 2023-10-25 PROCEDURE — 3008F BODY MASS INDEX DOCD: CPT | Mod: CPTII,,, | Performed by: FAMILY MEDICINE

## 2023-10-25 PROCEDURE — 3079F DIAST BP 80-89 MM HG: CPT | Mod: CPTII,,, | Performed by: FAMILY MEDICINE

## 2023-10-25 PROCEDURE — 99212 OFFICE O/P EST SF 10 MIN: CPT | Mod: ,,, | Performed by: FAMILY MEDICINE

## 2023-10-25 PROCEDURE — 1159F MED LIST DOCD IN RCRD: CPT | Mod: CPTII,,, | Performed by: FAMILY MEDICINE

## 2023-10-25 PROCEDURE — 3079F PR MOST RECENT DIASTOLIC BLOOD PRESSURE 80-89 MM HG: ICD-10-PCS | Mod: CPTII,,, | Performed by: FAMILY MEDICINE

## 2023-10-25 PROCEDURE — 3075F PR MOST RECENT SYSTOLIC BLOOD PRESS GE 130-139MM HG: ICD-10-PCS | Mod: CPTII,,, | Performed by: FAMILY MEDICINE

## 2023-10-25 PROCEDURE — 1160F PR REVIEW ALL MEDS BY PRESCRIBER/CLIN PHARMACIST DOCUMENTED: ICD-10-PCS | Mod: CPTII,,, | Performed by: FAMILY MEDICINE

## 2023-10-25 PROCEDURE — 4010F ACE/ARB THERAPY RXD/TAKEN: CPT | Mod: CPTII,,, | Performed by: FAMILY MEDICINE

## 2023-10-25 PROCEDURE — 1159F PR MEDICATION LIST DOCUMENTED IN MEDICAL RECORD: ICD-10-PCS | Mod: CPTII,,, | Performed by: FAMILY MEDICINE

## 2023-10-25 NOTE — PROGRESS NOTES
"SUBJECTIVE:  HPI    Sharif Conley is a 33 y.o. male here for Headache, Dizziness, Nausea, and Anxiety.  Patient began having symptoms of headache, dizziness, nausea, loose bowel movements in increasing anxiety in the last 4-5 days.  He had a friend, his age, that recently  of brain cancer with intracerebral hemorrhage.  His symptoms began shortly after his friend .      Sharif's allergies, medications, history, and problem list were updated as appropriate.    ROS:  Pertinent ROS as above, otherwise negative    OBJECTIVE:  Vital signs  Visit Vitals  /88 (BP Location: Right arm, Patient Position: Sitting, BP Method: Medium (Manual))   Pulse 71   Temp 98.7 °F (37.1 °C) (Oral)   Ht 5' 10" (1.778 m)   Wt 103.1 kg (227 lb 3.2 oz)   SpO2 99%   BMI 32.60 kg/m²          PHYSICAL EXAM:  General: Awake, alert, no acute distress  ENT:  External auditory canals normal bilaterally .  Tympanic membranes normal bilaterally.  Oropharynx clear.    Neck:  No bruits, no masses  Cardiovascular:  Regular rhythm.  Normal rate.  No murmurs.  Respiratory: Clear to auscultation bilaterally, normal effort  Abdomen: Soft, nontender, nondistended, no hepatosplenomegaly   Extremities:  No cyanosis, no clubbing, no edema  Neuro:  Cranial nerves 2-12 are intact with usual testing.  Gait is normal.  Moves all 4 extremities equally and symmetrically.  Psychiatric:  Anxious      ASSESSMENT/PLAN:  1. Stress reaction  Assessment & Plan:  Reassurance     If symptoms worsen or persist beyond 10 days, let us know and we will re-evaluate        "

## 2023-10-26 ENCOUNTER — PATIENT MESSAGE (OUTPATIENT)
Dept: FAMILY MEDICINE | Facility: CLINIC | Age: 33
End: 2023-10-26
Payer: COMMERCIAL

## 2023-11-23 DIAGNOSIS — F41.9 ANXIETY: ICD-10-CM

## 2023-11-27 RX ORDER — SERTRALINE HYDROCHLORIDE 25 MG/1
TABLET, FILM COATED ORAL
Qty: 90 TABLET | Refills: 3 | Status: SHIPPED | OUTPATIENT
Start: 2023-11-27

## 2023-12-22 ENCOUNTER — TELEPHONE (OUTPATIENT)
Dept: FAMILY MEDICINE | Facility: CLINIC | Age: 33
End: 2023-12-22
Payer: COMMERCIAL

## 2023-12-22 NOTE — TELEPHONE ENCOUNTER
Wife states pt's bps have been 150s/90s the last few months (not every day) They are not overly concerned about it, but would like an appt to discuss. The are requesting an appt for January 3rd.. we have scheduled to patient for 2 pm

## 2023-12-23 RX ORDER — AMLODIPINE BESYLATE 5 MG/1
5 TABLET ORAL DAILY
Qty: 30 TABLET | Refills: 1 | Status: SHIPPED | OUTPATIENT
Start: 2023-12-23 | End: 2024-01-02 | Stop reason: DRUGHIGH

## 2023-12-29 ENCOUNTER — TELEPHONE (OUTPATIENT)
Dept: FAMILY MEDICINE | Facility: CLINIC | Age: 33
End: 2023-12-29
Payer: COMMERCIAL

## 2023-12-29 NOTE — TELEPHONE ENCOUNTER
"Spoke to pt and explained this to him. Asked him to continue taking BP meds and keep a log to bring in on the 3rd. He stated that Urgent care gave him amoxicillin because "his BP might be high because he might be sick" pt wasn't actually tested for anything. I explained that urgent care often gives out amoxicillin when it is not needed, he stated it hasn't been doing anything really. I did tell him it is up to him on if he continues or not.. also encourage him to try alkaseltzer OTC and saline nasal washes. He understands. He will call the answering service over the weekend in case of emergency.  "

## 2024-01-02 ENCOUNTER — OFFICE VISIT (OUTPATIENT)
Dept: FAMILY MEDICINE | Facility: CLINIC | Age: 34
End: 2024-01-02
Payer: COMMERCIAL

## 2024-01-02 VITALS
OXYGEN SATURATION: 97 % | DIASTOLIC BLOOD PRESSURE: 86 MMHG | HEART RATE: 69 BPM | TEMPERATURE: 98 F | HEIGHT: 70 IN | BODY MASS INDEX: 32.07 KG/M2 | SYSTOLIC BLOOD PRESSURE: 152 MMHG | WEIGHT: 224 LBS

## 2024-01-02 DIAGNOSIS — I10 PRIMARY HYPERTENSION: Primary | ICD-10-CM

## 2024-01-02 PROCEDURE — 3077F SYST BP >= 140 MM HG: CPT | Mod: CPTII,,, | Performed by: FAMILY MEDICINE

## 2024-01-02 PROCEDURE — 1160F RVW MEDS BY RX/DR IN RCRD: CPT | Mod: CPTII,,, | Performed by: FAMILY MEDICINE

## 2024-01-02 PROCEDURE — 1159F MED LIST DOCD IN RCRD: CPT | Mod: CPTII,,, | Performed by: FAMILY MEDICINE

## 2024-01-02 PROCEDURE — 99214 OFFICE O/P EST MOD 30 MIN: CPT | Mod: ,,, | Performed by: FAMILY MEDICINE

## 2024-01-02 PROCEDURE — 3079F DIAST BP 80-89 MM HG: CPT | Mod: CPTII,,, | Performed by: FAMILY MEDICINE

## 2024-01-02 PROCEDURE — 3008F BODY MASS INDEX DOCD: CPT | Mod: CPTII,,, | Performed by: FAMILY MEDICINE

## 2024-01-02 RX ORDER — AMOXICILLIN AND CLAVULANATE POTASSIUM 875; 125 MG/1; MG/1
1 TABLET, FILM COATED ORAL 2 TIMES DAILY
COMMUNITY
Start: 2023-12-23 | End: 2024-01-24

## 2024-01-02 RX ORDER — AMLODIPINE BESYLATE 10 MG/1
10 TABLET ORAL DAILY
Qty: 90 TABLET | Refills: 3 | Status: SHIPPED | OUTPATIENT
Start: 2024-01-02 | End: 2025-01-01

## 2024-01-02 RX ORDER — HYDROCHLOROTHIAZIDE 25 MG/1
25 TABLET ORAL DAILY
Qty: 90 TABLET | Refills: 3 | Status: SHIPPED | OUTPATIENT
Start: 2024-01-02 | End: 2025-01-01

## 2024-01-02 NOTE — PROGRESS NOTES
"SUBJECTIVE:  HPI    Sharif Conley is a 33 y.o. male here for Hypertension.     The patient has had increased blood pressures over the last several weeks.  He went to the urgent care clinic several weeks ago and was started on amlodipine 5 mg daily.  Because his blood pressure was not improving, he increase the dose to 10 mg daily and since that time his systolic blood pressures have consistently been in the 140-150 range and diastolic blood pressures in the 90s.  He denies any headaches, visual changes, shortness on breath, chest pain.    Sharif's allergies, medications, history, and problem list were updated as appropriate.    ROS:  Pertinent ROS as above, otherwise negative    OBJECTIVE:  Vital signs  Visit Vitals  BP (!) 152/86 (BP Location: Left arm)   Pulse 69   Temp 98.1 °F (36.7 °C) (Temporal)   Ht 5' 10" (1.778 m)   Wt 101.6 kg (224 lb)   SpO2 97%   BMI 32.14 kg/m²          PHYSICAL EXAM:  General:  Awake, alert, no acute distress   Eyes:  Pupils equal, round, reactive to light.  Conjunctiva normal bilaterally.  Cardiovascular: Regular rate and rhythm.  No murmurs.  Respiratory: Clear to auscultation bilaterally, normal effort  Abdomen: Soft, nontender, nondistended, no hepatosplenomegaly or masses.  No abdominal bruits  Extremities:  No peripheral edema, no cyanosis  Skin: No rashes      ASSESSMENT/PLAN:  1. Primary hypertension  Assessment & Plan:  Increase amlodipine to 10 mg daily     Add hydrochlorothiazide 25 mg daily     Continue metoprolol succinate 25 mg daily     Blood pressure monitoring at home     Return to clinic in 3-4 weeks, we will check BMP    Orders:  -     hydroCHLOROthiazide (HYDRODIURIL) 25 MG tablet; Take 1 tablet (25 mg total) by mouth once daily.  Dispense: 90 tablet; Refill: 3  -     amLODIPine (NORVASC) 10 MG tablet; Take 1 tablet (10 mg total) by mouth once daily.  Dispense: 90 tablet; Refill: 3      Follow Up:  Follow up in about 3 weeks (around 1/23/2024) for Follow-up.          "

## 2024-01-02 NOTE — ASSESSMENT & PLAN NOTE
Increase amlodipine to 10 mg daily     Add hydrochlorothiazide 25 mg daily     Continue metoprolol succinate 25 mg daily     Blood pressure monitoring at home     Return to clinic in 3-4 weeks, we will check BMP

## 2024-01-24 ENCOUNTER — OFFICE VISIT (OUTPATIENT)
Dept: FAMILY MEDICINE | Facility: CLINIC | Age: 34
End: 2024-01-24
Payer: COMMERCIAL

## 2024-01-24 VITALS
TEMPERATURE: 97 F | SYSTOLIC BLOOD PRESSURE: 130 MMHG | OXYGEN SATURATION: 98 % | WEIGHT: 228 LBS | DIASTOLIC BLOOD PRESSURE: 78 MMHG | HEIGHT: 70 IN | BODY MASS INDEX: 32.64 KG/M2 | HEART RATE: 74 BPM

## 2024-01-24 DIAGNOSIS — T46.4X5A ANGIOEDEMA DUE TO ANGIOTENSIN CONVERTING ENZYME INHIBITOR (ACE-I): ICD-10-CM

## 2024-01-24 DIAGNOSIS — I10 PRIMARY HYPERTENSION: Primary | ICD-10-CM

## 2024-01-24 DIAGNOSIS — T78.3XXA ANGIOEDEMA DUE TO ANGIOTENSIN CONVERTING ENZYME INHIBITOR (ACE-I): ICD-10-CM

## 2024-01-24 DIAGNOSIS — R00.2 PALPITATIONS: ICD-10-CM

## 2024-01-24 DIAGNOSIS — M67.432 GANGLION CYST OF DORSUM OF LEFT WRIST: ICD-10-CM

## 2024-01-24 PROCEDURE — 3078F DIAST BP <80 MM HG: CPT | Mod: CPTII,,, | Performed by: FAMILY MEDICINE

## 2024-01-24 PROCEDURE — 1160F RVW MEDS BY RX/DR IN RCRD: CPT | Mod: CPTII,,, | Performed by: FAMILY MEDICINE

## 2024-01-24 PROCEDURE — 3008F BODY MASS INDEX DOCD: CPT | Mod: CPTII,,, | Performed by: FAMILY MEDICINE

## 2024-01-24 PROCEDURE — 99214 OFFICE O/P EST MOD 30 MIN: CPT | Mod: ,,, | Performed by: FAMILY MEDICINE

## 2024-01-24 PROCEDURE — 1159F MED LIST DOCD IN RCRD: CPT | Mod: CPTII,,, | Performed by: FAMILY MEDICINE

## 2024-01-24 PROCEDURE — 3075F SYST BP GE 130 - 139MM HG: CPT | Mod: CPTII,,, | Performed by: FAMILY MEDICINE

## 2024-01-24 NOTE — PROGRESS NOTES
"SUBJECTIVE:  HPI    Sharif Conley is a 33 y.o. male here for Follow-up (HTN).  Here for follow-up as per office note dated January 2, 2024.    Home blood pressures have shown systolic blood pressures in the 140s but diastolic blood pressures have been in the 70s.  He has only been on high HCTZ for about one-week because his prescription did not come through in the mail.    Denies any palpitations.    He does report some mild erectile dysfunction.    He also reports a swelling in the left wrist.    Martinas allergies, medications, history, and problem list were updated as appropriate.    ROS:  Pertinent ROS as above, otherwise negative    OBJECTIVE:  Vital signs  Visit Vitals  /78 (BP Location: Left arm, Patient Position: Sitting)   Pulse 74   Temp 97 °F (36.1 °C) (Temporal)   Ht 5' 10" (1.778 m)   Wt 103.4 kg (228 lb)   SpO2 98%   BMI 32.71 kg/m²          PHYSICAL EXAM:  General: Awake, alert, no acute distress  Musculoskeletal:  Distal forearm dorsal ganglion cyst palpable on the left      ASSESSMENT/PLAN:  1. Primary hypertension  Assessment & Plan:  Continue HCTZ 25 mg daily and amlodipine 10 mg daily     Discontinue metoprolol succinate because of erectile dysfunction continue blood pressure monitoring    Recheck in 3 months      2. Angioedema due to angiotensin converting enzyme inhibitor (ACE-I)  Assessment & Plan:  Avoid Ace-I and ARBs      3. Palpitations  Assessment & Plan:  Discontinue metoprolol succinate because of erectile dysfunction      4. Ganglion cyst of dorsum of left wrist  Assessment & Plan:  Can refer to Orthopedics when ready        Follow Up:  Follow up in about 3 months (around 4/24/2024) for Follow-up.          "

## 2024-01-24 NOTE — ASSESSMENT & PLAN NOTE
Continue HCTZ 25 mg daily and amlodipine 10 mg daily     Discontinue metoprolol succinate because of erectile dysfunction continue blood pressure monitoring    Recheck in 3 months

## 2024-03-20 ENCOUNTER — TELEPHONE (OUTPATIENT)
Dept: FAMILY MEDICINE | Facility: CLINIC | Age: 34
End: 2024-03-20
Payer: COMMERCIAL

## 2024-03-20 DIAGNOSIS — I10 PRIMARY HYPERTENSION: Primary | ICD-10-CM

## 2024-03-20 RX ORDER — DOXAZOSIN 2 MG/1
2 TABLET ORAL NIGHTLY
Qty: 30 TABLET | Refills: 11 | Status: SHIPPED | OUTPATIENT
Start: 2024-03-20 | End: 2024-05-08 | Stop reason: SINTOL

## 2024-03-20 NOTE — TELEPHONE ENCOUNTER
Pt states that even on the metoprolol amlodipine and HCTZ his BP is staying around 150-155/70-80. He is asking if his meds need to be adjusted.

## 2024-05-08 ENCOUNTER — OFFICE VISIT (OUTPATIENT)
Dept: FAMILY MEDICINE | Facility: CLINIC | Age: 34
End: 2024-05-08
Payer: COMMERCIAL

## 2024-05-08 VITALS
OXYGEN SATURATION: 98 % | HEIGHT: 70 IN | HEART RATE: 78 BPM | BODY MASS INDEX: 32.3 KG/M2 | TEMPERATURE: 99 F | WEIGHT: 225.63 LBS | DIASTOLIC BLOOD PRESSURE: 84 MMHG | SYSTOLIC BLOOD PRESSURE: 138 MMHG

## 2024-05-08 DIAGNOSIS — I10 BENIGN ESSENTIAL HYPERTENSION: Primary | ICD-10-CM

## 2024-05-08 PROCEDURE — 1159F MED LIST DOCD IN RCRD: CPT | Mod: CPTII,,, | Performed by: FAMILY MEDICINE

## 2024-05-08 PROCEDURE — 1160F RVW MEDS BY RX/DR IN RCRD: CPT | Mod: CPTII,,, | Performed by: FAMILY MEDICINE

## 2024-05-08 PROCEDURE — 3008F BODY MASS INDEX DOCD: CPT | Mod: CPTII,,, | Performed by: FAMILY MEDICINE

## 2024-05-08 PROCEDURE — 3075F SYST BP GE 130 - 139MM HG: CPT | Mod: CPTII,,, | Performed by: FAMILY MEDICINE

## 2024-05-08 PROCEDURE — 99213 OFFICE O/P EST LOW 20 MIN: CPT | Mod: ,,, | Performed by: FAMILY MEDICINE

## 2024-05-08 PROCEDURE — 3079F DIAST BP 80-89 MM HG: CPT | Mod: CPTII,,, | Performed by: FAMILY MEDICINE

## 2024-05-08 NOTE — PROGRESS NOTES
"SUBJECTIVE:  HPI    Sharif Conley is a 33 y.o. male here for Hypertension (3mth f/u HTN).  In the interval since his last visit, his blood pressures were elevated so we added doxazosin 2 mg daily.  However, the patient had side effects with the doxazosin.  For some reason, the patient did not discontinue metoprolol as we had previously discussed at his last visit.    He has been feeling well.  No chest pain, palpitations, dyspnea.  Blood pressures at home have been very normal with systolic readings in the 120s.      Martinas allergies, medications, history, and problem list were updated as appropriate.    ROS:  Pertinent ROS as above, otherwise negative    OBJECTIVE:  Vital signs  Visit Vitals  /84   Pulse 78   Temp 98.5 °F (36.9 °C) (Temporal)   Ht 5' 10" (1.778 m)   Wt 102.3 kg (225 lb 9.6 oz)   SpO2 98%   BMI 32.37 kg/m²          PHYSICAL EXAM:  General:  Awake, alert, no acute distress   Eyes:  Pupils equal, round, reactive to light.  Conjunctiva normal bilaterally.  Extremities:  No peripheral edema, no cyanosis  Skin: No rashes      ASSESSMENT/PLAN:  1. Benign essential hypertension  Overview:  Allergy to ACE inhibitor     Side effects with doxazosin (excessive fatigue)    Side effects with metoprolol (ED)    Assessment & Plan:  Continue HCTZ 25 mg daily and amlodipine 10 mg daily             Follow Up:  Follow up in about 4 months (around 9/8/2024) for chronic health conditions.          "

## 2024-05-29 ENCOUNTER — E-VISIT (OUTPATIENT)
Dept: FAMILY MEDICINE | Facility: CLINIC | Age: 34
End: 2024-05-29
Payer: COMMERCIAL

## 2024-05-29 DIAGNOSIS — J01.80 ACUTE NON-RECURRENT SINUSITIS OF OTHER SINUS: Primary | ICD-10-CM

## 2024-05-29 PROBLEM — J01.90 ACUTE NON-RECURRENT SINUSITIS: Status: ACTIVE | Noted: 2024-05-29

## 2024-05-29 PROCEDURE — 99421 OL DIG E/M SVC 5-10 MIN: CPT | Mod: ,,, | Performed by: FAMILY MEDICINE

## 2024-05-29 RX ORDER — CEFDINIR 300 MG/1
300 CAPSULE ORAL 2 TIMES DAILY
Qty: 20 CAPSULE | Refills: 0 | Status: SHIPPED | OUTPATIENT
Start: 2024-05-29 | End: 2024-06-08

## 2024-05-29 NOTE — PROGRESS NOTES
Patient ID: Sharif Conley is a 33 y.o. male.    Chief Complaint: URI (Entered automatically based on patient selection in Patient Portal.)    The patient initiated a request through [email or web address] on 5/29/2024 for evaluation and management with a chief complaint of URI (Entered automatically based on patient selection in Patient Portal.)     I evaluated the questionnaire submission on May 29, 2024.    [facility] E-Visit Covid    5/29/2024  9:20 AM CDT - Filed by Patient   Do you agree to participate in an E-Visit? Yes   If you have any of the following symptoms, go to your local emergency room or call 911: I acknowledge   What is the main issue you would like addressed today? Coughing up mucus and blowing nose for almost 2 weeks. Green thick mucus   Do you think you might have COVID or the Flu? No   Have you tested positive for COVID or Flu? No   What symptoms do you currently have?  Cough;  Nausea   Describe your cough: Productive (containing mucus)   Describe the mucus: Green;  Thick   Have you ever smoked? I smoked in the past   Have you had a fever? No   When did your symptoms first appear? 5/16/2024   In the last two weeks, have you been in close contact with someone who has COVID-19 or the Flu? No   List what you have done or taken to help your symptoms. Nothing   How severe are your symptoms? Moderate   Have your symptoms gotten better or worse since they started?  No change   Do you have transportation to get testing if it is needed and ordered for you at an [facility] location? Yes   Provide any additional information you feel is important.    Please attach any relevant images or files    Are you able to take your vital signs? Yes   Systolic Blood Pressure: 142   Diastolic Blood Pressure: 90   Weight: 225   Height: 70   Pulse: 84   Temperature:    Respiration rate:    Pulse Oxygen:          Encounter Diagnosis   Name Primary?    Acute non-recurrent sinusitis of other sinus Yes   Given the duration and severity of his  symptoms, we will treat for acute bacterial sinusitis      Medications Ordered This Encounter   Medications    cefdinir (OMNICEF) 300 MG capsule     Sig: Take 1 capsule (300 mg total) by mouth 2 (two) times daily. for 10 days     Dispense:  20 capsule     Refill:  0          E-Visit Time Trackin

## 2024-07-18 ENCOUNTER — OFFICE VISIT (OUTPATIENT)
Dept: FAMILY MEDICINE | Facility: CLINIC | Age: 34
End: 2024-07-18
Payer: COMMERCIAL

## 2024-07-18 VITALS
SYSTOLIC BLOOD PRESSURE: 136 MMHG | OXYGEN SATURATION: 97 % | HEIGHT: 70 IN | HEART RATE: 79 BPM | TEMPERATURE: 98 F | BODY MASS INDEX: 32.55 KG/M2 | WEIGHT: 227.38 LBS | DIASTOLIC BLOOD PRESSURE: 88 MMHG

## 2024-07-18 DIAGNOSIS — J15.7 PNEUMONIA OF RIGHT LOWER LOBE DUE TO MYCOPLASMA PNEUMONIAE: Primary | ICD-10-CM

## 2024-07-18 PROCEDURE — 99214 OFFICE O/P EST MOD 30 MIN: CPT | Mod: ,,, | Performed by: FAMILY MEDICINE

## 2024-07-18 PROCEDURE — 1159F MED LIST DOCD IN RCRD: CPT | Mod: CPTII,,, | Performed by: FAMILY MEDICINE

## 2024-07-18 PROCEDURE — 3075F SYST BP GE 130 - 139MM HG: CPT | Mod: CPTII,,, | Performed by: FAMILY MEDICINE

## 2024-07-18 PROCEDURE — 3008F BODY MASS INDEX DOCD: CPT | Mod: CPTII,,, | Performed by: FAMILY MEDICINE

## 2024-07-18 PROCEDURE — 3079F DIAST BP 80-89 MM HG: CPT | Mod: CPTII,,, | Performed by: FAMILY MEDICINE

## 2024-07-18 PROCEDURE — 1160F RVW MEDS BY RX/DR IN RCRD: CPT | Mod: CPTII,,, | Performed by: FAMILY MEDICINE

## 2024-07-18 RX ORDER — AZITHROMYCIN 250 MG/1
TABLET, FILM COATED ORAL
Qty: 6 TABLET | Refills: 0 | Status: SHIPPED | OUTPATIENT
Start: 2024-07-18 | End: 2024-07-18 | Stop reason: SDUPTHER

## 2024-07-18 RX ORDER — AZITHROMYCIN 250 MG/1
TABLET, FILM COATED ORAL
Qty: 6 TABLET | Refills: 0 | Status: SHIPPED | OUTPATIENT
Start: 2024-07-18 | End: 2024-07-22

## 2024-07-18 NOTE — PROGRESS NOTES
"SUBJECTIVE:  HPI    Sharif Conley is a 33 y.o. male here for Cough, Chest Congestion, and Headache.  He has a one-week history of worsening cough.  Initially, he had a dry cough then he developed a wet cough with production of sputum.  He has also developed a headache over the last 3 days that is worsened with coughing.  His headache is brief and resolves within 2 minutes of a coughing episode.  He denies sore throat or fever.      Sharif's allergies, medications, history, and problem list were updated as appropriate.    ROS:  Pertinent ROS as above, otherwise negative    OBJECTIVE:  Vital signs  Visit Vitals  /88 (BP Location: Left arm, Patient Position: Sitting, BP Method: Medium (Manual))   Pulse 79   Temp 97.8 °F (36.6 °C) (Temporal)   Ht 5' 10" (1.778 m)   Wt 103.1 kg (227 lb 6.4 oz)   SpO2 97%   BMI 32.63 kg/m²          PHYSICAL EXAM:  General:  Awake, alert, no acute distress   Eyes:  Pupils equal, round, reactive to light.  Conjunctiva normal bilaterally.  Ears:  Bilateral external auditory canals normal.  Bilateral tympanic membranes normal.  Oropharynx: Mild erythema and cobblestoning  Cardiovascular: Regular rate and rhythm.  No murmurs.  Respiratory:  Normal respiratory effort with right lower lobe fine crackles  Skin: No rashes      ASSESSMENT/PLAN:  1. Pneumonia of right lower lobe due to Mycoplasma pneumoniae  Assessment & Plan:  Z-Brook[facility]nn     Ibuprofen as needed for headaches    Orders:  -     azithromycin (Z-BROOKLYNN) 250 MG tablet; Take 2 tablets (500 mg total) by mouth once daily for 1 day, THEN 1 tablet (250 mg total) once daily for 4 days.  Dispense: 6 tablet; Refill: 0        "

## 2024-09-02 PROBLEM — J01.90 ACUTE NON-RECURRENT SINUSITIS: Status: RESOLVED | Noted: 2024-05-29 | Resolved: 2024-09-02

## 2024-09-09 ENCOUNTER — OFFICE VISIT (OUTPATIENT)
Dept: FAMILY MEDICINE | Facility: CLINIC | Age: 34
End: 2024-09-09
Payer: COMMERCIAL

## 2024-09-09 VITALS
HEIGHT: 70 IN | BODY MASS INDEX: 33.19 KG/M2 | TEMPERATURE: 99 F | DIASTOLIC BLOOD PRESSURE: 72 MMHG | WEIGHT: 231.81 LBS | SYSTOLIC BLOOD PRESSURE: 124 MMHG | OXYGEN SATURATION: 97 % | HEART RATE: 80 BPM

## 2024-09-09 DIAGNOSIS — J01.80 ACUTE NON-RECURRENT SINUSITIS OF OTHER SINUS: ICD-10-CM

## 2024-09-09 DIAGNOSIS — I10 BENIGN ESSENTIAL HYPERTENSION: Primary | ICD-10-CM

## 2024-09-09 PROBLEM — J15.7 PNEUMONIA OF RIGHT LOWER LOBE DUE TO MYCOPLASMA PNEUMONIAE: Status: RESOLVED | Noted: 2024-07-18 | Resolved: 2024-09-09

## 2024-09-09 PROCEDURE — 99214 OFFICE O/P EST MOD 30 MIN: CPT | Mod: ,,, | Performed by: FAMILY MEDICINE

## 2024-09-09 PROCEDURE — 3008F BODY MASS INDEX DOCD: CPT | Mod: CPTII,,, | Performed by: FAMILY MEDICINE

## 2024-09-09 PROCEDURE — 3074F SYST BP LT 130 MM HG: CPT | Mod: CPTII,,, | Performed by: FAMILY MEDICINE

## 2024-09-09 PROCEDURE — 1159F MED LIST DOCD IN RCRD: CPT | Mod: CPTII,,, | Performed by: FAMILY MEDICINE

## 2024-09-09 PROCEDURE — 3078F DIAST BP <80 MM HG: CPT | Mod: CPTII,,, | Performed by: FAMILY MEDICINE

## 2024-09-09 PROCEDURE — 1160F RVW MEDS BY RX/DR IN RCRD: CPT | Mod: CPTII,,, | Performed by: FAMILY MEDICINE

## 2024-09-09 RX ORDER — AMOXICILLIN 500 MG/1
1000 TABLET, FILM COATED ORAL EVERY 12 HOURS
Qty: 40 TABLET | Refills: 0 | Status: SHIPPED | OUTPATIENT
Start: 2024-09-09 | End: 2024-09-09

## 2024-09-09 RX ORDER — AMOXICILLIN 500 MG/1
1000 TABLET, FILM COATED ORAL EVERY 12 HOURS
Qty: 40 TABLET | Refills: 0 | Status: SHIPPED | OUTPATIENT
Start: 2024-09-09 | End: 2024-09-19

## 2024-09-09 NOTE — PROGRESS NOTES
"SUBJECTIVE:  HPI    Sharif Conley is a 33 y.o. male here for 4 mth f/u on chronic health conditions.  He has stopped his Zoloft without any noticeable changes in symptoms.  He feels well.  His blood pressures have been normal.    He also reports a greater than 5 day history of nasal congestion, postnasal drip, dry cough.  No fever or chills.      Sharif's allergies, medications, history, and problem list were updated as appropriate.    ROS:  Pertinent ROS as above, otherwise negative    OBJECTIVE:  Vital signs  Visit Vitals  /72 (BP Location: Left arm, Patient Position: Sitting, BP Method: Medium (Manual))   Pulse 80   Temp 98.6 °F (37 °C) (Temporal)   Ht 5' 10" (1.778 m)   Wt 105.1 kg (231 lb 12.8 oz)   SpO2 97%   BMI 33.26 kg/m²          PHYSICAL EXAM:  General: Awake, alert, no acute distress  ENT:  External auditory canals normal bilaterally .  Tympanic membranes normal bilaterally.  Oropharynx with postnasal drainage.    Cardiovascular:  Regular rhythm.  Normal rate.  No murmurs.  Respiratory: Clear to auscultation bilaterally, normal effort  Neuro:  Cranial nerves 2-12 are intact with usual testing.  Gait is normal.  Moves all 4 extremities equally and symmetrically.  Psychiatric:  Normal mood and affect.      ASSESSMENT/PLAN:  1. Benign essential hypertension  Overview:  Allergy to ACE inhibitor     Side effects with doxazosin (excessive fatigue)    Side effects with metoprolol (ED)    Assessment & Plan:  Continue HCTZ 25 mg daily and amlodipine 10 mg daily           2. Acute non-recurrent sinusitis of other sinus  -     Discontinue: amoxicillin (AMOXIL) 500 MG Tab; Take 2 tablets (1,000 mg total) by mouth every 12 (twelve) hours. for 10 days  Dispense: 40 tablet; Refill: 0  -     amoxicillin (AMOXIL) 500 MG Tab; Take 2 tablets (1,000 mg total) by mouth every 12 (twelve) hours. for 10 days  Dispense: 40 tablet; Refill: 0      Follow Up:  Follow up in about 6 months (around 3/9/2025) for chronic health " conditions.

## 2024-09-19 DIAGNOSIS — F41.9 ANXIETY: ICD-10-CM

## 2024-09-19 RX ORDER — SERTRALINE HYDROCHLORIDE 25 MG/1
TABLET, FILM COATED ORAL
Qty: 90 TABLET | Refills: 3 | Status: SHIPPED | OUTPATIENT
Start: 2024-09-19

## 2024-10-25 DIAGNOSIS — I10 PRIMARY HYPERTENSION: ICD-10-CM

## 2024-10-28 RX ORDER — HYDROCHLOROTHIAZIDE 25 MG/1
25 TABLET ORAL
Qty: 90 TABLET | Refills: 3 | Status: SHIPPED | OUTPATIENT
Start: 2024-10-28

## 2024-10-28 RX ORDER — AMLODIPINE BESYLATE 10 MG/1
10 TABLET ORAL
Qty: 90 TABLET | Refills: 3 | Status: SHIPPED | OUTPATIENT
Start: 2024-10-28

## 2024-12-26 ENCOUNTER — OFFICE VISIT (OUTPATIENT)
Dept: FAMILY MEDICINE | Facility: CLINIC | Age: 34
End: 2024-12-26
Payer: COMMERCIAL

## 2024-12-26 VITALS
HEART RATE: 78 BPM | OXYGEN SATURATION: 97 % | DIASTOLIC BLOOD PRESSURE: 82 MMHG | HEIGHT: 70 IN | BODY MASS INDEX: 32.67 KG/M2 | SYSTOLIC BLOOD PRESSURE: 138 MMHG | WEIGHT: 228.19 LBS | TEMPERATURE: 98 F

## 2024-12-26 DIAGNOSIS — G44.89 OTHER HEADACHE SYNDROME: Primary | ICD-10-CM

## 2024-12-26 PROCEDURE — 3075F SYST BP GE 130 - 139MM HG: CPT | Mod: CPTII,,, | Performed by: FAMILY MEDICINE

## 2024-12-26 PROCEDURE — 3008F BODY MASS INDEX DOCD: CPT | Mod: CPTII,,, | Performed by: FAMILY MEDICINE

## 2024-12-26 PROCEDURE — 1159F MED LIST DOCD IN RCRD: CPT | Mod: CPTII,,, | Performed by: FAMILY MEDICINE

## 2024-12-26 PROCEDURE — 3079F DIAST BP 80-89 MM HG: CPT | Mod: CPTII,,, | Performed by: FAMILY MEDICINE

## 2024-12-26 PROCEDURE — 99213 OFFICE O/P EST LOW 20 MIN: CPT | Mod: ,,, | Performed by: FAMILY MEDICINE

## 2024-12-26 PROCEDURE — 1160F RVW MEDS BY RX/DR IN RCRD: CPT | Mod: CPTII,,, | Performed by: FAMILY MEDICINE

## 2024-12-26 NOTE — PROGRESS NOTES
"SUBJECTIVE:  HPI    Sharif Conley is a 34 y.o. male here for Headache (Headaches, blood pressure issues).  Patient reports a several month history of recurrent, brief, very sharp, stabbing quality headache that lasts less than a few seconds each time and occurs randomly during the day.  His headaches occur a proximally 2-3 times per week.  The headaches are not associated with any coughing, straining, sneezing, sexual intercourse, or exertion.  He denies any associated blurry vision, dysphagia, paresthesias, weakness.      Sharif's allergies, medications, history, and problem list were updated as appropriate.    ROS:  Pertinent ROS as above, otherwise negative    OBJECTIVE:  Vital signs  Visit Vitals  /82 (BP Location: Right arm, Patient Position: Sitting)   Pulse 78   Temp 98.3 °F (36.8 °C)   Ht 5' 10" (1.778 m)   Wt 103.5 kg (228 lb 3.2 oz)   SpO2 97%   BMI 32.74 kg/m²          PHYSICAL EXAM:  General: Awake, alert, no acute distress  Neck:  Normal range of motion without reproducing pain, even with straining  Neuro:  Cranial nerves 2-12 are intact with usual testing.  Gait is normal.  Moves all 4 extremities equally and symmetrically.  Cerebellum is intact with rapid alternating hand movements and finger-to-nose testing.  Romberg is negative.  Sensation is intact in all extremities.  There is no nystagmus.  Psychiatric:  Normal mood and affect.      ASSESSMENT/PLAN:  1. Other headache syndrome  Assessment & Plan:  Reassurance     No worrisome signs or symptoms    Return to clinic for any worrisome signs or symptoms, discussed in detail with the patient        "

## 2024-12-26 NOTE — ASSESSMENT & PLAN NOTE
Reassurance     No worrisome signs or symptoms    Return to clinic for any worrisome signs or symptoms, discussed in detail with the patient

## 2025-03-28 ENCOUNTER — OFFICE VISIT (OUTPATIENT)
Dept: FAMILY MEDICINE | Facility: CLINIC | Age: 35
End: 2025-03-28
Payer: COMMERCIAL

## 2025-03-28 VITALS
HEART RATE: 88 BPM | WEIGHT: 226.19 LBS | HEIGHT: 70 IN | SYSTOLIC BLOOD PRESSURE: 138 MMHG | DIASTOLIC BLOOD PRESSURE: 82 MMHG | TEMPERATURE: 98 F | OXYGEN SATURATION: 99 % | BODY MASS INDEX: 32.38 KG/M2

## 2025-03-28 DIAGNOSIS — M62.838 TRAPEZIUS MUSCLE SPASM: ICD-10-CM

## 2025-03-28 DIAGNOSIS — I10 BENIGN ESSENTIAL HYPERTENSION: Primary | ICD-10-CM

## 2025-03-28 RX ORDER — CHLORTHALIDONE 50 MG/1
50 TABLET ORAL DAILY
Qty: 30 TABLET | Refills: 11 | Status: SHIPPED | OUTPATIENT
Start: 2025-03-28 | End: 2026-03-28

## 2025-03-28 NOTE — ASSESSMENT & PLAN NOTE
Home exercise program with stretches and pain relieving techniques     Try ice, heat, ice and heat, NSAIDs and massage    Should resolve over the next 3-4 weeks

## 2025-03-28 NOTE — PROGRESS NOTES
"SUBJECTIVE:  HPI    Sharif Conley is a 34 y.o. male here for 6 mth , Neck Pain (3 wks), and Back Pain (Upper Back. 3 wks).   History of Present Illness    CHIEF COMPLAINT:  Patient presents today with neck pain and headaches    MUSCULOSKELETAL AND NEUROLOGIC:  He reports pain in the trapezius area with knot formation, described as feeling like a ball. The pain radiates to the top of his head, with sensation of splitting and branching across the scalp. He experiences sharp pain when turning his head, described as a knife-like sensation going to the back of the skull. He experiences brief episodes of blackouts lasting approximately one second, accompanied by loss of vision and hearing, which occur with forceful neck movements. These episodes are associated with a sensation of strong pulse and have been occurring since the onset of neck pain. He denies numbness or tingling in hands or arms.  The onset of the symptoms began after he was aggressively throwing a baseball with his son 1 afternoon.  Massage has definitely helped.    HYPERTENSION:  His blood pressure typically runs around 145/80. He continues HCTZ and amlodipine for management. A previous 20-pound weight loss did not affect blood pressure control.           Sharif's allergies, medications, history, and problem list were updated as appropriate.    ROS:  Pertinent ROS as above, otherwise negative    OBJECTIVE:  Vital signs  Visit Vitals  /82 (BP Location: Left arm, Patient Position: Sitting)   Pulse 88   Temp 98.4 °F (36.9 °C) (Temporal)   Ht 5' 10" (1.778 m)   Wt 102.6 kg (226 lb 3.2 oz)   SpO2 99%   BMI 32.46 kg/m²          PHYSICAL EXAM:  General: Awake, alert, no acute distress  Neck:  Normal range of motion.  Tenderness to palpation in the upper right trapezius and increased pain with resisted lateral rotation of the neck as well as neck flexion.  Cardiovascular:  Regular rhythm.  Normal rate.  No murmurs.  Respiratory: Clear to auscultation " bilaterally, normal effort  Neuro:  Cranial nerves 2-12 are intact with usual testing.  Gait is normal.  Moves all 4 extremities equally and symmetrically.  Psychiatric:  Normal mood and affect.    ASSESSMENT/PLAN:  1. Benign essential hypertension  Overview:  Allergy to ACE inhibitor     Side effects with doxazosin (excessive fatigue)    Side effects with metoprolol (ED)    Assessment & Plan:  His blood pressure remains poorly controlled despite amlodipine 10 mg daily and HCTZ 25 mg daily    We will change HCTZ to chlorthalidone 50 mg daily    He will monitor his blood pressures at home and we can increase to 100 mg daily if blood pressures remain elevated.      Otherwise, we will see him back in 3 months        Orders:  -     chlorthalidone (HYGROTEN) 50 MG Tab; Take 1 tablet (50 mg total) by mouth once daily.  Dispense: 30 tablet; Refill: 11    2. Trapezius muscle spasm  Assessment & Plan:  Home exercise program with stretches and pain relieving techniques     Try ice, heat, ice and heat, NSAIDs and massage    Should resolve over the next 3-4 weeks        Follow Up:  Follow up in about 3 months (around 6/28/2025) for HTN.        This note was generated with the assistance of ambient listening technology. Verbal consent was obtained by the patient and accompanying visitor(s) for the recording of patient appointment to facilitate this note. I attest to having reviewed and edited the generated note for accuracy, though some syntax or spelling errors may persist. Please contact the author of this note for any clarification.

## 2025-03-28 NOTE — ASSESSMENT & PLAN NOTE
His blood pressure remains poorly controlled despite amlodipine 10 mg daily and HCTZ 25 mg daily    We will change HCTZ to chlorthalidone 50 mg daily    He will monitor his blood pressures at home and we can increase to 100 mg daily if blood pressures remain elevated.      Otherwise, we will see him back in 3 months

## 2025-03-31 ENCOUNTER — OFFICE VISIT (OUTPATIENT)
Dept: FAMILY MEDICINE | Facility: CLINIC | Age: 35
End: 2025-03-31
Payer: COMMERCIAL

## 2025-03-31 ENCOUNTER — HOSPITAL ENCOUNTER (EMERGENCY)
Facility: HOSPITAL | Age: 35
Discharge: HOME | End: 2025-03-31
Attending: EMERGENCY MEDICINE
Payer: COMMERCIAL

## 2025-03-31 VITALS
TEMPERATURE: 98 F | RESPIRATION RATE: 18 BRPM | HEIGHT: 71 IN | DIASTOLIC BLOOD PRESSURE: 81 MMHG | OXYGEN SATURATION: 99 % | SYSTOLIC BLOOD PRESSURE: 141 MMHG | WEIGHT: 234.31 LBS | HEART RATE: 72 BPM | BODY MASS INDEX: 32.8 KG/M2

## 2025-03-31 VITALS
HEART RATE: 87 BPM | BODY MASS INDEX: 31.81 KG/M2 | HEIGHT: 71 IN | WEIGHT: 227.19 LBS | SYSTOLIC BLOOD PRESSURE: 134 MMHG | OXYGEN SATURATION: 97 % | TEMPERATURE: 99 F | DIASTOLIC BLOOD PRESSURE: 82 MMHG

## 2025-03-31 DIAGNOSIS — T46.4X5A ANGIOEDEMA DUE TO ANGIOTENSIN CONVERTING ENZYME INHIBITOR (ACE-I): ICD-10-CM

## 2025-03-31 DIAGNOSIS — T78.3XXA ANGIOEDEMA DUE TO ANGIOTENSIN CONVERTING ENZYME INHIBITOR (ACE-I): ICD-10-CM

## 2025-03-31 DIAGNOSIS — R79.89 ELEVATED SERUM CREATININE: ICD-10-CM

## 2025-03-31 DIAGNOSIS — I10 ESSENTIAL HYPERTENSION: ICD-10-CM

## 2025-03-31 DIAGNOSIS — R42 LIGHTHEADEDNESS: Primary | ICD-10-CM

## 2025-03-31 DIAGNOSIS — F43.0 STRESS REACTION: ICD-10-CM

## 2025-03-31 DIAGNOSIS — E87.6 HYPOKALEMIA: ICD-10-CM

## 2025-03-31 DIAGNOSIS — I10 BENIGN ESSENTIAL HYPERTENSION: Primary | ICD-10-CM

## 2025-03-31 DIAGNOSIS — E87.1 HYPONATREMIA: ICD-10-CM

## 2025-03-31 DIAGNOSIS — R42 DIZZINESS: ICD-10-CM

## 2025-03-31 LAB
ALBUMIN SERPL-MCNC: 5.3 G/DL (ref 3.4–5)
ALBUMIN/GLOB SERPL: 1.9 RATIO
ALP SERPL-CCNC: 108 UNIT/L (ref 50–144)
ALT SERPL-CCNC: 27 UNIT/L (ref 1–45)
ANION GAP SERPL CALC-SCNC: 8 MEQ/L (ref 2–13)
AST SERPL-CCNC: 29 UNIT/L (ref 17–59)
BASOPHILS # BLD AUTO: 0.03 X10(3)/MCL (ref 0.01–0.08)
BASOPHILS NFR BLD AUTO: 0.4 % (ref 0.1–1.2)
BILIRUB SERPL-MCNC: 0.4 MG/DL (ref 0–1)
BUN SERPL-MCNC: 15 MG/DL (ref 7–20)
CALCIUM SERPL-MCNC: 9.7 MG/DL (ref 8.4–10.2)
CHLORIDE SERPL-SCNC: 98 MMOL/L (ref 98–110)
CO2 SERPL-SCNC: 29 MMOL/L (ref 21–32)
CREAT SERPL-MCNC: 1.28 MG/DL (ref 0.66–1.25)
CREAT/UREA NIT SERPL: 12 (ref 12–20)
EOSINOPHIL # BLD AUTO: 0.13 X10(3)/MCL (ref 0.04–0.54)
EOSINOPHIL NFR BLD AUTO: 1.9 % (ref 0.7–7)
ERYTHROCYTE [DISTWIDTH] IN BLOOD BY AUTOMATED COUNT: 11.9 %
GFR SERPLBLD CREATININE-BSD FMLA CKD-EPI: 76 ML/MIN/1.73/M2
GLOBULIN SER-MCNC: 2.8 GM/DL (ref 2–3.9)
GLUCOSE SERPL-MCNC: 117 MG/DL (ref 70–115)
HCT VFR BLD AUTO: 46.6 % (ref 36–52)
HGB BLD-MCNC: 16.6 G/DL (ref 13–18)
IMM GRANULOCYTES # BLD AUTO: 0.02 X10(3)/MCL (ref 0–0.03)
IMM GRANULOCYTES NFR BLD AUTO: 0.3 % (ref 0–0.5)
LYMPHOCYTES # BLD AUTO: 2.6 X10(3)/MCL (ref 1.32–3.57)
LYMPHOCYTES NFR BLD AUTO: 37.4 % (ref 20–55)
MCH RBC QN AUTO: 29.8 PG (ref 27–34)
MCHC RBC AUTO-ENTMCNC: 35.6 G/DL (ref 31–37)
MCV RBC AUTO: 83.7 FL (ref 79–99)
MONOCYTES # BLD AUTO: 0.69 X10(3)/MCL (ref 0.3–0.82)
MONOCYTES NFR BLD AUTO: 9.9 % (ref 4.7–12.5)
NEUTROPHILS # BLD AUTO: 3.48 X10(3)/MCL (ref 1.78–5.38)
NEUTROPHILS NFR BLD AUTO: 50.1 % (ref 37–73)
NRBC BLD AUTO-RTO: 0 %
OHS QRS DURATION: 104 MS
OHS QTC CALCULATION: 447 MS
PLATELET # BLD AUTO: 253 X10(3)/MCL (ref 140–371)
PMV BLD AUTO: 11.5 FL (ref 9.4–12.4)
POTASSIUM SERPL-SCNC: 3.4 MMOL/L (ref 3.5–5.1)
PROT SERPL-MCNC: 8.1 GM/DL (ref 6.3–8.2)
RBC # BLD AUTO: 5.57 X10(6)/MCL (ref 4–6)
SODIUM SERPL-SCNC: 135 MMOL/L (ref 136–145)
TSH SERPL-ACNC: 3.46 UIU/ML (ref 0.36–3.74)
WBC # BLD AUTO: 6.95 X10(3)/MCL (ref 4–11.5)

## 2025-03-31 PROCEDURE — 99284 EMERGENCY DEPT VISIT MOD MDM: CPT | Mod: 25

## 2025-03-31 PROCEDURE — 25000003 PHARM REV CODE 250: Performed by: EMERGENCY MEDICINE

## 2025-03-31 PROCEDURE — 93005 ELECTROCARDIOGRAM TRACING: CPT

## 2025-03-31 PROCEDURE — 96360 HYDRATION IV INFUSION INIT: CPT

## 2025-03-31 PROCEDURE — 80053 COMPREHEN METABOLIC PANEL: CPT | Performed by: EMERGENCY MEDICINE

## 2025-03-31 PROCEDURE — 85025 COMPLETE CBC W/AUTO DIFF WBC: CPT | Performed by: EMERGENCY MEDICINE

## 2025-03-31 PROCEDURE — 1160F RVW MEDS BY RX/DR IN RCRD: CPT | Mod: CPTII,,, | Performed by: FAMILY MEDICINE

## 2025-03-31 PROCEDURE — 3079F DIAST BP 80-89 MM HG: CPT | Mod: CPTII,,, | Performed by: FAMILY MEDICINE

## 2025-03-31 PROCEDURE — 1159F MED LIST DOCD IN RCRD: CPT | Mod: CPTII,,, | Performed by: FAMILY MEDICINE

## 2025-03-31 PROCEDURE — 3008F BODY MASS INDEX DOCD: CPT | Mod: CPTII,,, | Performed by: FAMILY MEDICINE

## 2025-03-31 PROCEDURE — 3075F SYST BP GE 130 - 139MM HG: CPT | Mod: CPTII,,, | Performed by: FAMILY MEDICINE

## 2025-03-31 PROCEDURE — 84443 ASSAY THYROID STIM HORMONE: CPT | Performed by: EMERGENCY MEDICINE

## 2025-03-31 PROCEDURE — 99214 OFFICE O/P EST MOD 30 MIN: CPT | Mod: ,,, | Performed by: FAMILY MEDICINE

## 2025-03-31 PROCEDURE — 93010 ELECTROCARDIOGRAM REPORT: CPT | Mod: ,,, | Performed by: INTERNAL MEDICINE

## 2025-03-31 RX ORDER — POTASSIUM CHLORIDE 20 MEQ/1
20 TABLET, EXTENDED RELEASE ORAL
Status: COMPLETED | OUTPATIENT
Start: 2025-03-31 | End: 2025-03-31

## 2025-03-31 RX ORDER — SODIUM CHLORIDE 9 MG/ML
500 INJECTION, SOLUTION INTRAVENOUS
Status: DISCONTINUED | OUTPATIENT
Start: 2025-03-31 | End: 2025-03-31

## 2025-03-31 RX ADMIN — POTASSIUM CHLORIDE 20 MEQ: 1500 TABLET, EXTENDED RELEASE ORAL at 07:03

## 2025-03-31 RX ADMIN — SODIUM CHLORIDE 1000 ML: 9 INJECTION, SOLUTION INTRAVENOUS at 07:03

## 2025-03-31 NOTE — ASSESSMENT & PLAN NOTE
I am perplexed by this patient's difficult to control blood pressure and incidence of severe side effects with various medications.  He did have an angioedema episode with ACE inhibitors in his unable to tolerate ACE inhibitors.    After recent change of hydrochlorothiazide 25 mg to chlorthalidone 50 mg because of poor blood pressure control, he had this episode where he went to the emergency room this morning.    I have asked him to change back to hydrochlorothiazide 25 mg daily and continue amlodipine 10 mg daily.      We will refer to cardiology for further evaluation of his refractory hypertension and evaluate for possible secondary cause of hypertension.

## 2025-03-31 NOTE — Clinical Note
"Sharif"Fernanda Conley was seen and treated in our emergency department on 3/31/2025.  He may return to work on 04/01/2025.       If you have any questions or concerns, please don't hesitate to call.      Sukhdev Caro MD"

## 2025-03-31 NOTE — ED PROVIDER NOTES
Encounter Date: 3/31/2025       History     Chief Complaint   Patient presents with    Palpitations     Pt c/o heart palpitations, dizziness, weakness, and nausea onset approx. 3 hours pta with a near syncopal episode approx. 1 hour pta.  Pt reports second day taking new medication, Chlorthalidone 50mg PO.      Dizziness    Nausea    Weakness     Patient is a 34-year-old male who presents to the emergency department with elevated blood pressure, lightheadedness, dizziness, nausea.  No syncope, but he reportedly lost his footing prior to arrival with a near-syncope episode.  No reports of any vomiting, diarrhea, fever, chest pain.  Symptoms have been worse this morning, but started yesterday.  Patient started a new medication 2 days ago.  He was taking amlodipine and hydrochlorothiazide, but his blood pressures were reportedly running in the 140s and 150 range systolic.  His physician switched his hydrochlorothiazide to 50 of chlorthalidone and that is when his symptoms started.      Review of patient's allergies indicates:   Allergen Reactions    Ace inhibitors Swelling    Lisinopril Other (See Comments)     Past Medical History:   Diagnosis Date    Generalized anxiety disorder 06/15/2023    Hypertension     Pure hypercholesterolemia 06/15/2023     Past Surgical History:   Procedure Laterality Date    TONSILLECTOMY  1993    TONSILLECTOMY AND ADENOIDECTOMY       Family History   Problem Relation Name Age of Onset    Hypertension Father Matthew hyde     Asthma Maternal Grandmother Sridevi wallace      Social History[1]  Review of Systems  See HPI. Otherwise Review of Systems negative.      Physical Exam     Initial Vitals [03/31/25 0654]   BP Pulse Resp Temp SpO2   (!) 180/98 (!) 116 18 98.1 °F (36.7 °C) 100 %      MAP       --         Physical Exam    Nursing note and vitals reviewed.  Constitutional: He appears well-developed and well-nourished. No distress.   HENT:   Head: Normocephalic and atraumatic. Mouth/Throat:  Oropharynx is clear and moist.   Eyes: Conjunctivae and EOM are normal. Pupils are equal, round, and reactive to light.   Neck: Neck supple. No tracheal deviation present.   Cardiovascular:  Normal rate, regular rhythm, normal heart sounds and intact distal pulses.           Pulmonary/Chest: Breath sounds normal. No respiratory distress.   Abdominal: Abdomen is soft. He exhibits no distension. There is no abdominal tenderness. There is no rebound and no guarding.   Musculoskeletal:         General: No tenderness or edema. Normal range of motion.      Cervical back: Neck supple.     Neurological: He is alert and oriented to person, place, and time. GCS score is 15. GCS eye subscore is 4. GCS verbal subscore is 5. GCS motor subscore is 6.   No focal deficits   Skin: Skin is warm. No rash noted. No erythema.   Psychiatric: He has a normal mood and affect. His behavior is normal.         ED Course   Procedures  Labs Reviewed   COMPREHENSIVE METABOLIC PANEL - Abnormal       Result Value    Sodium 135 (*)     Potassium 3.4 (*)     Chloride 98      CO2 29      Glucose 117 (*)     Blood Urea Nitrogen 15      Creatinine 1.28 (*)     Calcium 9.7      Protein Total 8.1      Albumin 5.3 (*)     Globulin 2.8      Albumin/Globulin Ratio 1.9      Bilirubin Total 0.4            ALT 27      AST 29      eGFR 76      Anion Gap 8.0      BUN/Creatinine Ratio 12     TSH - Normal    TSH 3.460     CBC W/ AUTO DIFFERENTIAL    Narrative:     The following orders were created for panel order CBC auto differential.  Procedure                               Abnormality         Status                     ---------                               -----------         ------                     CBC with Differential[[id number]]                            Final result                 Please view results for these tests on the individual orders.   CBC WITH DIFFERENTIAL    WBC 6.95      RBC 5.57      Hgb 16.6      Hct 46.6      MCV 83.7      MCH  29.8      MCHC 35.6      RDW 11.9      Platelet 253      MPV 11.5      Neut % 50.1      Lymph % 37.4      Mono % 9.9      Eos % 1.9      Basophil % 0.4      Lymph # 2.60      Neut # 3.48      Mono # 0.69      Eos # 0.13      Baso # 0.03      Imm Gran # 0.02      Imm Grans % 0.3      NRBC% 0.0       EKG Readings: (Independently Interpreted)   EKG reviewed and interpreted by ED provider as sinus tachycardia with a rate of 108, normal axis, normal intervals, normal ST-T segments         Imaging Results    None          Medications   potassium chloride SA CR tablet 20 mEq (20 mEq Oral Given 3/31/25 0743)   sodium chloride 0.9% bolus 1,000 mL 1,000 mL (0 mLs Intravenous Stopped 3/31/25 0843)     Medical Decision Making  Patient recently switched to 50 mg of chlorthalidone daily.  Patient not tolerating this medication at this dose well.  Lightheaded with near-syncope.  Mild electrolyte abnormalities with a mild elevated creatinine as well.  Orthostatic vitals show tachycardia upon standing.  Patient was treated with IV fluids electrolyte replacement and feeling better.  Blood pressure elevated here in the ED as well.  Patient had no side-effects of his amlodipine and hydrochlorothiazide regimen.  His blood pressure just was not at goal.  Systolics running in the 140s and 150s.  Advised patient to switch back to his old regimen of amlodipine and hydrochlorothiazide for now, and call his primary care physician to be re-evaluated.  Any further medication adjustments will be made by his primary care physician.  Patient discharged home in stable/improved condition with ER return precautions    Amount and/or Complexity of Data Reviewed  External Data Reviewed: notes.     Details: Past medical history, medications, allergies reviewed  Labs: ordered. Decision-making details documented in ED Course.  ECG/medicine tests: ordered and independent interpretation performed. Decision-making details documented in ED  Course.    Risk  Prescription drug management.                     Vitals:    03/31/25 0722 03/31/25 0724 03/31/25 0726 03/31/25 0806   BP: (!) 151/87 (!) 152/97 (!) 151/94 (!) 141/81   BP Location: Right arm Right arm Right arm Right arm   Patient Position: Lying Sitting Standing Sitting   Pulse: 80 85 98 72   Resp: 16 18 20 18   Temp:       TempSrc:       SpO2: 96% 99% 97% 99%   Weight:       Height:                          Clinical Impression:  Final diagnoses:  [I10] Essential hypertension  [R42] Dizziness  [R42] Lightheadedness (Primary)  [E87.6] Hypokalemia  [E87.1] Hyponatremia  [R79.89] Elevated serum creatinine          ED Disposition Condition    Discharge Stable          ED Prescriptions    None       Follow-up Information       Follow up With Specialties Details Why Contact Info    Follow back up with your primary care physician        [facility]  As needed, If symptoms worsen [address] [facility] [id number] [phone]                 [1]   Social History  Tobacco Use    Smoking status: Former    Smokeless tobacco: Former     Types: Chew   Substance Use Topics    Alcohol use: Yes     Alcohol/week: 4.0 standard drinks of alcohol     Types: 4 Drinks containing 0.5 oz of alcohol per week    Drug use: Never        Sukhdev Caro MD  03/31/25 1124

## 2025-03-31 NOTE — PROGRESS NOTES
SUBJECTIVE:  PAULO Conley is a 34 y.o. male here for ER F/U  (ER today for High BP, Low Potassium & Dehydration. ).   History of Present Illness    CHIEF COMPLAINT:  Patient presents today for follow up after an ER visit due to severe lightheadedness and elevated blood pressure this morning.    RECENT BLOOD PRESSURE EPISODES:  He experienced an acute episode at 3 AM when his self-reported blood pressure increased from 115/65 his heart rate was 122.  Back to bed and around 515 in the morning he got out of bed after he was unable to sleep and he began feeling severe lightheadedness, stumbling, queasiness, and near-syncope without loss of consciousness. Similar episodes have occurred over the past few days. After starting a chlorthalidone 50 mg daily (changed from HCTZ 25 mg daily because of poor control) medication on Saturday, his blood pressure increased from 120 to 160 within 1-2 hours. On Saturday, he had to temporarily leave a benefit event due to not feeling well, and experienced another episode yesterday morning.  Because his blood pressure continued to escalate this morning and he was having these other symptoms, he went to the emergency room.    He reports that he began feeling very anxious, which is a recurring symptom.    EMERGENCY ROOM VISIT:  During ER evaluation this morning, labs showed low potassium (3.4). He received IV fluids and medication, with blood pressure improving to 141/84 at discharge.    ASSOCIATED SYMPTOMS:  He reports a bubbling sensation in his chest, described as his heartbeat producing bubbles, occurring primarily at rest when things are quiet but not during physical activity.    SOCIAL HISTORY:  He reports alcohol consumption (6-7 beers) the night before the episode this morning.            Sharif's allergies, medications, history, and problem list were updated as appropriate.    ROS:  Pertinent ROS as above, otherwise negative    OBJECTIVE:  Vital signs  Visit Vitals  /82  "(BP Location: Left arm, Patient Position: Sitting)   Pulse 87   Temp 99 °F (37.2 °C) (Temporal)   Ht 5' 10.98" (1.803 m)   Wt 103.1 kg (227 lb 3.2 oz)   SpO2 97%   BMI 31.70 kg/m²          PHYSICAL EXAM:  General:  Awake, alert, no acute distress   Eyes:  Pupils equal, round, reactive to light.  Conjunctiva normal bilaterally.  Neck:  No lymphadenopathy, no bruit  Cardiovascular: Regular rate and rhythm.  No murmurs.  Respiratory: Clear to auscultation bilaterally, normal effort  Abdomen: Soft, nontender, nondistended, no hepatosplenomegaly or masses, no abdominal bruit  Extremities:  No peripheral edema, no cyanosis  Skin: No rashes    Recent Results (from the past 24 hours)   EKG 12-lead    Collection Time: 03/31/25  6:47 AM   Result Value Ref Range    QRS Duration 104 ms    [facility] QTC Calculation 447 ms   Comprehensive metabolic panel    Collection Time: 03/31/25  7:15 AM   Result Value Ref Range    Sodium 135 (L) 136 - 145 mmol/L    Potassium 3.4 (L) 3.5 - 5.1 mmol/L    Chloride 98 98 - 110 mmol/L    CO2 29 21 - 32 mmol/L    Glucose 117 (H) 70 - 115 mg/dL    Blood Urea Nitrogen 15 7.0 - 20.0 mg/dL    Creatinine 1.28 (H) 0.66 - 1.25 mg/dL    Calcium 9.7 8.4 - 10.2 mg/dL    Protein Total 8.1 6.3 - 8.2 gm/dL    Albumin 5.3 (H) 3.4 - 5.0 g/dL    Globulin 2.8 2.0 - 3.9 gm/dL    Albumin/Globulin Ratio 1.9 ratio    Bilirubin Total 0.4 0.0 - 1.0 mg/dL     50 - 144 unit/L    ALT 27 1 - 45 unit/L    AST 29 17 - 59 unit/L    eGFR 76 mL/min/1.73/m2    Anion Gap 8.0 2.0 - 13.0 mEq/L    BUN/Creatinine Ratio 12 12 - 20   TSH    Collection Time: 03/31/25  7:15 AM   Result Value Ref Range    TSH 3.460 0.360 - 3.740 uIU/mL   CBC with Differential    Collection Time: 03/31/25  7:15 AM   Result Value Ref Range    WBC 6.95 4.00 - 11.50 x10(3)/mcL    RBC 5.57 4.00 - 6.00 x10(6)/mcL    Hgb 16.6 13.0 - 18.0 g/dL    Hct 46.6 36.0 - 52.0 %    MCV 83.7 79.0 - 99.0 fL    MCH 29.8 27.0 - 34.0 pg    MCHC 35.6 31.0 - 37.0 g/dL    RDW 11.9 " %    Platelet 253 140 - 371 x10(3)/mcL    MPV 11.5 9.4 - 12.4 fL    Neut % 50.1 37 - 73 %    Lymph % 37.4 20 - 55 %    Mono % 9.9 4.7 - 12.5 %    Eos % 1.9 0.7 - 7 %    Basophil % 0.4 0.1 - 1.2 %    Lymph # 2.60 1.32 - 3.57 x10(3)/mcL    Neut # 3.48 1.78 - 5.38 x10(3)/mcL    Mono # 0.69 0.3 - 0.82 x10(3)/mcL    Eos # 0.13 0.04 - 0.54 x10(3)/mcL    Baso # 0.03 0.01 - 0.08 x10(3)/mcL    Imm Gran # 0.02 0.00 - 0.03 x10(3)/mcL    Imm Grans % 0.3 0 - 0.5 %    NRBC% 0.0 <=1 %       ASSESSMENT/PLAN:  1. Benign essential hypertension  Overview:  Allergy to ACE inhibitor     Side effects with doxazosin (excessive fatigue)    Side effects with metoprolol (ED)    Assessment & Plan:  I am perplexed by this patient's difficult to control blood pressure and incidence of severe side effects with various medications.  He did have an angioedema episode with ACE inhibitors in his unable to tolerate ACE inhibitors.    After recent change of hydrochlorothiazide 25 mg to chlorthalidone 50 mg because of poor blood pressure control, he had this episode where he went to the emergency room this morning.    I have asked him to change back to hydrochlorothiazide 25 mg daily and continue amlodipine 10 mg daily.      We will refer to cardiology for further evaluation of his refractory hypertension and evaluate for possible secondary cause of hypertension.    Orders:  -     Ambulatory referral/consult to Cardiology; Future; Expected date: 04/07/2025    2. Angioedema due to angiotensin converting enzyme inhibitor (ACE-I)  -     Ambulatory referral/consult to Cardiology; Future; Expected date: 04/07/2025    3. Stress reaction  Assessment & Plan:  I really believe that his symptoms this morning were exacerbated by panic attack        This note was generated with the assistance of ambient listening technology. Verbal consent was obtained by the patient and accompanying visitor(s) for the recording of patient appointment to facilitate this note. I  attest to having reviewed and edited the generated note for accuracy, though some syntax or spelling errors may persist. Please contact the author of this note for any clarification.

## 2025-04-01 ENCOUNTER — HOSPITAL ENCOUNTER (OUTPATIENT)
Dept: RADIOLOGY | Facility: HOSPITAL | Age: 35
Discharge: HOME | End: 2025-04-01
Attending: INTERNAL MEDICINE
Payer: COMMERCIAL

## 2025-04-01 ENCOUNTER — PATIENT MESSAGE (OUTPATIENT)
Dept: FAMILY MEDICINE | Facility: CLINIC | Age: 35
End: 2025-04-01
Payer: COMMERCIAL

## 2025-04-01 DIAGNOSIS — I10 HTN (HYPERTENSION): ICD-10-CM

## 2025-04-01 PROCEDURE — 75571 CT HRT W/O DYE W/CA TEST: CPT | Mod: TC

## 2025-04-01 PROCEDURE — 76770 US EXAM ABDO BACK WALL COMP: CPT | Mod: TC

## 2025-06-18 ENCOUNTER — OFFICE VISIT (OUTPATIENT)
Dept: FAMILY MEDICINE | Facility: CLINIC | Age: 35
End: 2025-06-18
Payer: COMMERCIAL

## 2025-06-18 VITALS
TEMPERATURE: 99 F | BODY MASS INDEX: 29.35 KG/M2 | SYSTOLIC BLOOD PRESSURE: 128 MMHG | HEART RATE: 75 BPM | DIASTOLIC BLOOD PRESSURE: 70 MMHG | HEIGHT: 71 IN | OXYGEN SATURATION: 97 % | WEIGHT: 209.63 LBS

## 2025-06-18 DIAGNOSIS — I10 BENIGN ESSENTIAL HYPERTENSION: Primary | ICD-10-CM

## 2025-06-18 PROBLEM — F43.0 STRESS REACTION: Status: RESOLVED | Noted: 2023-10-25 | Resolved: 2025-06-18

## 2025-06-18 PROCEDURE — 3008F BODY MASS INDEX DOCD: CPT | Mod: CPTII,,, | Performed by: FAMILY MEDICINE

## 2025-06-18 PROCEDURE — 1159F MED LIST DOCD IN RCRD: CPT | Mod: CPTII,,, | Performed by: FAMILY MEDICINE

## 2025-06-18 PROCEDURE — 3074F SYST BP LT 130 MM HG: CPT | Mod: CPTII,,, | Performed by: FAMILY MEDICINE

## 2025-06-18 PROCEDURE — 1160F RVW MEDS BY RX/DR IN RCRD: CPT | Mod: CPTII,,, | Performed by: FAMILY MEDICINE

## 2025-06-18 PROCEDURE — 3078F DIAST BP <80 MM HG: CPT | Mod: CPTII,,, | Performed by: FAMILY MEDICINE

## 2025-06-18 PROCEDURE — 99213 OFFICE O/P EST LOW 20 MIN: CPT | Mod: ,,, | Performed by: FAMILY MEDICINE

## 2025-06-18 RX ORDER — HYDROCHLOROTHIAZIDE 25 MG/1
25 TABLET ORAL DAILY
COMMUNITY
Start: 2025-04-08

## 2025-06-18 NOTE — PROGRESS NOTES
"SUBJECTIVE:  HPI    Sharif Conley is a 34 y.o. male here for Follow-up (3 MONTH HTN).   History of Present Illness    CHIEF COMPLAINT:  Patient presents today for follow up    BLOOD PRESSURE:  He reports home blood pressure readings typically in the 120s/50s, with elevated readings noted immediately after arriving home. He denies any recent near-syncopal episodes.    WEIGHT MANAGEMENT:  He reports an 18-pound weight loss since last visit through lifestyle modifications including intermittent fasting twice weekly, consuming only dinner on those days. His exercise routine consists of limited strength training with pushups and situps, with minimal cardio activity. He also reports significant reduction in alcohol consumption.    CURRENT ILLNESS:  He reports recent cold with fever lasting several days. Currently experiencing improving nasal congestion.    MEDICATIONS:  He continues hydrochlorothiazide and amlodipine.  He did discontinue chlorthalidone.           Martinas allergies, medications, history, and problem list were updated as appropriate.    ROS:  Pertinent ROS as above, otherwise negative    OBJECTIVE:  Vital signs  Visit Vitals  /70   Pulse 75   Temp 98.7 °F (37.1 °C) (Temporal)   Ht 5' 10.98" (1.803 m)   Wt 95.1 kg (209 lb 9.6 oz)   SpO2 97%   BMI 29.25 kg/m²          PHYSICAL EXAM:  General:  Awake, alert, no acute distress, weight down 18 lb since last visit   Cardiovascular: Regular rate and rhythm.  No murmurs.  Respiratory: Clear to auscultation bilaterally, normal effort  Extremities:  No peripheral edema, no cyanosis  Skin: No rashes      ASSESSMENT/PLAN:  1. Benign essential hypertension  Overview:  Allergy to ACE inhibitor     Side effects with doxazosin (excessive fatigue)    Side effects with metoprolol (ED)    Assessment & Plan:  Markedly improved with lifestyle changes and amlodipine 10 mg daily with hydrochlorothiazide 25 mg daily          Follow Up:  Follow up in about 6 months (around " 12/18/2025) for chronic health conditions.        This note was generated with the assistance of ambient listening technology. Verbal consent was obtained by the patient and accompanying visitor(s) for the recording of patient appointment to facilitate this note. I attest to having reviewed and edited the generated note for accuracy, though some syntax or spelling errors may persist. Please contact the author of this note for any clarification.

## 2025-06-18 NOTE — ASSESSMENT & PLAN NOTE
Markedly improved with lifestyle changes and amlodipine 10 mg daily with hydrochlorothiazide 25 mg daily